# Patient Record
Sex: FEMALE | Employment: OTHER | URBAN - METROPOLITAN AREA
[De-identification: names, ages, dates, MRNs, and addresses within clinical notes are randomized per-mention and may not be internally consistent; named-entity substitution may affect disease eponyms.]

---

## 2017-02-17 ENCOUNTER — HOSPITAL ENCOUNTER (OUTPATIENT)
Age: 67
Setting detail: SPECIMEN
Discharge: HOME | End: 2017-02-17
Payer: MEDICARE

## 2017-02-17 LAB
CHOLESTEROL/HDL RATIO: 3.1
CHOLESTEROL: 154 MG/DL
HDLC SERPL-MCNC: 49 MG/DL
LDL CHOLESTEROL: 81 MG/DL (ref 0–130)
TRIGL SERPL-MCNC: 119 MG/DL
VLDLC SERPL CALC-MCNC: NORMAL MG/DL (ref 1–30)

## 2017-12-22 ENCOUNTER — HOSPITAL ENCOUNTER (OUTPATIENT)
Dept: MAMMOGRAPHY | Age: 67
Discharge: HOME | End: 2017-12-22
Payer: MEDICARE

## 2017-12-22 DIAGNOSIS — Z12.31 ENCOUNTER FOR SCREENING MAMMOGRAM FOR BREAST CANCER: ICD-10-CM

## 2017-12-22 PROCEDURE — G0202 SCR MAMMO BI INCL CAD: HCPCS

## 2018-04-06 ENCOUNTER — HOSPITAL ENCOUNTER (OUTPATIENT)
Age: 68
Setting detail: SPECIMEN
Discharge: HOME | End: 2018-04-06
Payer: MEDICARE

## 2018-04-06 LAB
ALBUMIN SERPL-MCNC: 3.9 G/DL (ref 3.5–5.2)
ALBUMIN/GLOBULIN RATIO: 1 (ref 1–2.5)
ALP BLD-CCNC: 106 U/L (ref 35–104)
ALT SERPL-CCNC: 24 U/L (ref 5–33)
ANION GAP SERPL CALCULATED.3IONS-SCNC: 12 MMOL/L (ref 9–17)
AST SERPL-CCNC: 19 U/L
BILIRUB SERPL-MCNC: 0.19 MG/DL (ref 0.3–1.2)
BUN BLDV-MCNC: 12 MG/DL (ref 8–23)
BUN/CREAT BLD: ABNORMAL (ref 9–20)
CALCIUM SERPL-MCNC: 9.7 MG/DL (ref 8.6–10.4)
CHLORIDE BLD-SCNC: 103 MMOL/L (ref 98–107)
CHOLESTEROL/HDL RATIO: 3.2
CHOLESTEROL: 145 MG/DL
CO2: 25 MMOL/L (ref 20–31)
CREAT SERPL-MCNC: 0.63 MG/DL (ref 0.5–0.9)
GFR AFRICAN AMERICAN: >60 ML/MIN
GFR NON-AFRICAN AMERICAN: >60 ML/MIN
GFR SERPL CREATININE-BSD FRML MDRD: ABNORMAL ML/MIN/{1.73_M2}
GFR SERPL CREATININE-BSD FRML MDRD: ABNORMAL ML/MIN/{1.73_M2}
GLUCOSE BLD-MCNC: 89 MG/DL (ref 70–99)
HDLC SERPL-MCNC: 46 MG/DL
LDL CHOLESTEROL: 67 MG/DL (ref 0–130)
POTASSIUM SERPL-SCNC: 4.4 MMOL/L (ref 3.7–5.3)
SODIUM BLD-SCNC: 140 MMOL/L (ref 135–144)
TOTAL PROTEIN: 8 G/DL (ref 6.4–8.3)
TRIGL SERPL-MCNC: 161 MG/DL
VLDLC SERPL CALC-MCNC: ABNORMAL MG/DL (ref 1–30)

## 2018-06-12 ENCOUNTER — HOSPITAL ENCOUNTER (OUTPATIENT)
Dept: MAMMOGRAPHY | Age: 68
Discharge: HOME | End: 2018-06-14
Payer: MEDICARE

## 2018-06-12 DIAGNOSIS — Z13.820 SCREENING FOR OSTEOPOROSIS: ICD-10-CM

## 2018-06-12 PROCEDURE — 77080 DXA BONE DENSITY AXIAL: CPT

## 2018-08-09 DIAGNOSIS — Z12.11 SCREENING FOR COLON CANCER: Primary | ICD-10-CM

## 2018-08-20 ENCOUNTER — TELEPHONE (OUTPATIENT)
Dept: GASTROENTEROLOGY | Age: 68
End: 2018-08-20

## 2018-08-20 NOTE — TELEPHONE ENCOUNTER
LM for patient to return phone call to confirm procedure for 8/23 at [facility], arrival time of 830 am, understanding of bowel prep and transportation requirements.

## 2018-08-22 ENCOUNTER — ANESTHESIA EVENT (OUTPATIENT)
Dept: OPERATING ROOM | Age: 68
End: 2018-08-22
Payer: MEDICARE

## 2018-08-23 ENCOUNTER — HOSPITAL ENCOUNTER (OUTPATIENT)
Age: 68
Setting detail: OUTPATIENT SURGERY
Discharge: HOME | End: 2018-08-23
Attending: INTERNAL MEDICINE | Admitting: INTERNAL MEDICINE
Payer: MEDICARE

## 2018-08-23 ENCOUNTER — ANESTHESIA (OUTPATIENT)
Dept: OPERATING ROOM | Age: 68
End: 2018-08-23
Payer: MEDICARE

## 2018-08-23 VITALS
DIASTOLIC BLOOD PRESSURE: 58 MMHG | RESPIRATION RATE: 21 BRPM | SYSTOLIC BLOOD PRESSURE: 112 MMHG | OXYGEN SATURATION: 100 %

## 2018-08-23 VITALS
DIASTOLIC BLOOD PRESSURE: 96 MMHG | BODY MASS INDEX: 26.29 KG/M2 | HEIGHT: 64 IN | WEIGHT: 154 LBS | OXYGEN SATURATION: 100 % | RESPIRATION RATE: 21 BRPM | HEART RATE: 83 BPM | SYSTOLIC BLOOD PRESSURE: 144 MMHG | TEMPERATURE: 97.9 F

## 2018-08-23 PROCEDURE — 6360000002 HC RX W HCPCS: Performed by: NURSE ANESTHETIST, CERTIFIED REGISTERED

## 2018-08-23 PROCEDURE — 3700000000 HC ANESTHESIA ATTENDED CARE: Performed by: INTERNAL MEDICINE

## 2018-08-23 PROCEDURE — 7100000011 HC PHASE II RECOVERY - ADDTL 15 MIN: Performed by: INTERNAL MEDICINE

## 2018-08-23 PROCEDURE — 3609010700 HC COLONOSCOPY POLYPECTOMY REMOVAL SNARE/STOMA: Performed by: INTERNAL MEDICINE

## 2018-08-23 PROCEDURE — 2709999900 HC NON-CHARGEABLE SUPPLY: Performed by: INTERNAL MEDICINE

## 2018-08-23 PROCEDURE — 2500000003 HC RX 250 WO HCPCS: Performed by: NURSE ANESTHETIST, CERTIFIED REGISTERED

## 2018-08-23 PROCEDURE — 88305 TISSUE EXAM BY PATHOLOGIST: CPT

## 2018-08-23 PROCEDURE — 2580000003 HC RX 258: Performed by: ANESTHESIOLOGY

## 2018-08-23 PROCEDURE — 3700000001 HC ADD 15 MINUTES (ANESTHESIA): Performed by: INTERNAL MEDICINE

## 2018-08-23 PROCEDURE — 7100000010 HC PHASE II RECOVERY - FIRST 15 MIN: Performed by: INTERNAL MEDICINE

## 2018-08-23 RX ORDER — LIDOCAINE HYDROCHLORIDE 10 MG/ML
INJECTION, SOLUTION EPIDURAL; INFILTRATION; INTRACAUDAL; PERINEURAL PRN
Status: DISCONTINUED | OUTPATIENT
Start: 2018-08-23 | End: 2018-08-23 | Stop reason: SDUPTHER

## 2018-08-23 RX ORDER — HYDROCHLOROTHIAZIDE 12.5 MG/1
12.5 TABLET ORAL DAILY
COMMUNITY

## 2018-08-23 RX ORDER — FENTANYL CITRATE 50 UG/ML
50 INJECTION, SOLUTION INTRAMUSCULAR; INTRAVENOUS EVERY 5 MIN PRN
Status: DISCONTINUED | OUTPATIENT
Start: 2018-08-23 | End: 2018-08-23 | Stop reason: HOSPADM

## 2018-08-23 RX ORDER — ONDANSETRON 2 MG/ML
4 INJECTION INTRAMUSCULAR; INTRAVENOUS
Status: DISCONTINUED | OUTPATIENT
Start: 2018-08-23 | End: 2018-08-23 | Stop reason: HOSPADM

## 2018-08-23 RX ORDER — IBUPROFEN 200 MG
1 CAPSULE ORAL DAILY
COMMUNITY

## 2018-08-23 RX ORDER — ANASTROZOLE 1 MG/1
1 TABLET ORAL DAILY
COMMUNITY
End: 2020-12-15 | Stop reason: ALTCHOICE

## 2018-08-23 RX ORDER — PROPOFOL 10 MG/ML
INJECTION, EMULSION INTRAVENOUS PRN
Status: DISCONTINUED | OUTPATIENT
Start: 2018-08-23 | End: 2018-08-23 | Stop reason: SDUPTHER

## 2018-08-23 RX ORDER — FENTANYL CITRATE 50 UG/ML
25 INJECTION, SOLUTION INTRAMUSCULAR; INTRAVENOUS EVERY 5 MIN PRN
Status: DISCONTINUED | OUTPATIENT
Start: 2018-08-23 | End: 2018-08-23 | Stop reason: HOSPADM

## 2018-08-23 RX ORDER — LIDOCAINE HYDROCHLORIDE 10 MG/ML
1 INJECTION, SOLUTION EPIDURAL; INFILTRATION; INTRACAUDAL; PERINEURAL
Status: DISCONTINUED | OUTPATIENT
Start: 2018-08-24 | End: 2018-08-23 | Stop reason: HOSPADM

## 2018-08-23 RX ORDER — POTASSIUM CHLORIDE 750 MG/1
10 CAPSULE, EXTENDED RELEASE ORAL DAILY
COMMUNITY

## 2018-08-23 RX ORDER — SODIUM CHLORIDE, SODIUM LACTATE, POTASSIUM CHLORIDE, CALCIUM CHLORIDE 600; 310; 30; 20 MG/100ML; MG/100ML; MG/100ML; MG/100ML
INJECTION, SOLUTION INTRAVENOUS CONTINUOUS
Status: DISCONTINUED | OUTPATIENT
Start: 2018-08-24 | End: 2018-08-23 | Stop reason: HOSPADM

## 2018-08-23 RX ADMIN — PROPOFOL 30 MG: 10 INJECTION, EMULSION INTRAVENOUS at 09:41

## 2018-08-23 RX ADMIN — SODIUM CHLORIDE, POTASSIUM CHLORIDE, SODIUM LACTATE AND CALCIUM CHLORIDE: 600; 310; 30; 20 INJECTION, SOLUTION INTRAVENOUS at 09:09

## 2018-08-23 RX ADMIN — PROPOFOL 20 MG: 10 INJECTION, EMULSION INTRAVENOUS at 09:50

## 2018-08-23 RX ADMIN — PROPOFOL 10 MG: 10 INJECTION, EMULSION INTRAVENOUS at 09:56

## 2018-08-23 RX ADMIN — PROPOFOL 20 MG: 10 INJECTION, EMULSION INTRAVENOUS at 09:44

## 2018-08-23 RX ADMIN — PROPOFOL 20 MG: 10 INJECTION, EMULSION INTRAVENOUS at 09:52

## 2018-08-23 RX ADMIN — PROPOFOL 20 MG: 10 INJECTION, EMULSION INTRAVENOUS at 09:46

## 2018-08-23 RX ADMIN — PROPOFOL 20 MG: 10 INJECTION, EMULSION INTRAVENOUS at 09:54

## 2018-08-23 RX ADMIN — LIDOCAINE HYDROCHLORIDE 30 MG: 10 INJECTION, SOLUTION EPIDURAL; INFILTRATION; INTRACAUDAL; PERINEURAL at 09:39

## 2018-08-23 RX ADMIN — PROPOFOL 30 MG: 10 INJECTION, EMULSION INTRAVENOUS at 09:42

## 2018-08-23 RX ADMIN — PROPOFOL 20 MG: 10 INJECTION, EMULSION INTRAVENOUS at 09:40

## 2018-08-23 RX ADMIN — PROPOFOL 20 MG: 10 INJECTION, EMULSION INTRAVENOUS at 09:48

## 2018-08-23 RX ADMIN — PROPOFOL 20 MG: 10 INJECTION, EMULSION INTRAVENOUS at 09:39

## 2018-08-23 ASSESSMENT — PULMONARY FUNCTION TESTS
PIF_VALUE: 1

## 2018-08-23 ASSESSMENT — PAIN SCALES - GENERAL
PAINLEVEL_OUTOF10: 0
PAINLEVEL_OUTOF10: 0

## 2018-08-23 ASSESSMENT — LIFESTYLE VARIABLES: SMOKING_STATUS: 1

## 2018-08-23 ASSESSMENT — PAIN - FUNCTIONAL ASSESSMENT: PAIN_FUNCTIONAL_ASSESSMENT: 0-10

## 2018-08-23 NOTE — ANESTHESIA PRE PROCEDURE
Department of Anesthesiology  Preprocedure Note       Name:  Julio C Blackwood   Age:  79 y.o.  :  1950                                          MRN:  [id number]         Date:  2018      Surgeon: Maddy Gallegos):  Melinda Gomez MD    Procedure: Procedure(s):  COLONOSCOPY    Medications prior to admission:   Prior to Admission medications    Medication Sig Start Date End Date Taking? Authorizing Provider   ATENOLOL PO Take by mouth    Historical Provider, MD   albuterol sulfate HFA (PROVENTIL HFA) 108 (90 BASE) MCG/ACT inhaler Inhale 1-2 puffs into the lungs every 4 hours as needed for Wheezing or Shortness of Breath (Space out to every 6 hours as symptoms improve) Space out to every 6 hours as symptoms improve. 12/15/16   Shun Mata MD       Current medications:    Current Facility-Administered Medications   Medication Dose Route Frequency Provider Last Rate Last Dose    [START ON 2018] lactated ringers infusion   [facility]ven[facility] lidocaine PF 1 % injection 1 mL  1 mL Intradermal Once PRN Juan Jose Chandra MD           Allergies:  No Known Allergies    Problem List:  There is no problem list on file for this patient. Past Medical History:  No past medical history on file. Past Surgical History:        Procedure Laterality Date    BREAST BIOPSY      right breast    COLONOSCOPY  2009    SKIN BIOPSY      removed from chest    TONSILLECTOMY AND ADENOIDECTOMY      TUBAL LIGATION         Social History:    Social History   Substance Use Topics    Smoking status: Not on file    Smokeless tobacco: Not on file    Alcohol use Not on file                                Counseling given: Not Answered      Vital Signs (Current): There were no vitals filed for this visit.                                            BP Readings from Last 3 Encounters:   12/15/16 116/76       NPO Status:

## 2018-08-23 NOTE — OP NOTE
COLONOSCOPY    DATE OF PROCEDURE: 8/23/2018    SURGEON: Brenton Pulido MD    ASSISTANT: None    PREOPERATIVE DIAGNOSIS: Screening colon    POSTOPERATIVE DIAGNOSIS: Polyp in the rectosigmoid area    OPERATION: Total colonoscopy     ANESTHESIA: Mac      ESTIMATED BLOOD LOSS: None    COMPLICATIONS: None     SPECIMENS:  Was Obtained: Polyp excised with biopsy forceps from rectosigmoid    HISTORY: The patient is a 79y.o. year old female with history of above preop diagnosis. I recommended colonoscopy with possible biopsy or polypectomy and I explained the risk, benefits, expected outcome, and alternatives to the procedure. Risks included but are not limited to bleeding, infection, respiratory distress, hypotension, and perforation of the colon and possibility of missing a lesion. The patient understands and is in agreement. PROCEDURE:  The patient's SPO2 remained above 90% throughout the procedure. Digital rectal exam was normal.  The colonoscope was inserted through the anus into the rectum and advanced under direct vision to the cecum without difficulty. Terminal ileum was examined for approximately 2 inches. The prep was poor. Findings:  Terminal ileum: normal    Cecum/Ascending colon: normal    Transverse colon: normal    Descending/Sigmoid colon: normal    Rectum/Anus: examined in normal and retroflexed positions and was abnormal: 5 mm polyp, pale looking in the rectosigmoid area excised with biopsy forceps. Patient has large amount of liquid present throughout the colon and in some areas this liquid could not be aspirated as the scope wasplugging up. small flat lesions in this poorly prepared colon  can be missed. Also there is a large amount of thick stool present in the rectum as well. Withdrawal Time was (minutes): 12          The colon was decompressed and the scope was removed. The patient tolerated the procedure and conscious sedation without unusual events.      During the procedure, the patient's blood pressure, pulse and oxygen saturation remained stable and documented. No unusual events occurred during the procedure. Patient was transferred to recovery room and will be discharged when criteria is met. Recommendations/Plan:   1. F/U Biopsies  2. F/U In Office as instructed  3. Discussed with the family  4. High fiber diet   5. Precautions to avoid constipation     Next colonoscopy: 3 years.   If Colonoscopy is less than 10 years the recommended reason is due:polyps, poor preparation    Electronically signed by [id number], MD  on 8/23/2018 at 10:01 AM

## 2018-08-23 NOTE — H&P
History and Physical Service   [place] [id number]    HISTORY AND PHYSICAL EXAMINATION            Date of Evaluation: 8/23/2018  Patient name:  Tom Nayak  MRN:   [id number]  YOB: 1950  PCP:    Tanner Ag    History Obtained From:     Patient    History of Present Illness: This is Tom Nayak a 79 y.o. female who presents today for a colonoscopy by Dr. Rey Noriega for colon screening. The patient completed the prep as directed until watery clear. Bowel movements have not  significantly changed No family history of colon cancer or polyps. Previous colonoscopy 2/2009. Patient denies bloody tarry stools, diarrhea alternating with constipation, fever, chills, night sweats, pain or unexplained weight loss. Past Medical History:     Past Medical History:   Diagnosis Date    Breast cancer ([facility] [address] 2013    left     Hypertension         Past Surgical History:     Past Surgical History:   Procedure Laterality Date    BREAST BIOPSY      right breast    BREAST LUMPECTOMY Left 2013    COLONOSCOPY  2/2009    SKIN BIOPSY  2000    removed from chest   [facility]        Medications Prior to Admission:     Prior to Admission medications    Medication Sig Start Date End Date Taking? Authorizing Provider   anastrozole (ARIMIDEX) 1 MG tablet Take 1 mg by mouth daily   Yes Historical Provider, MD   hydrochlorothiazide (HYDRODIURIL) 12.5 MG tablet Take 12.5 mg by mouth daily   Yes Historical Provider, MD   potassium chloride (MICRO-K) 10 MEQ extended release capsule Take 10 mEq by mouth daily   Yes Historical Provider, MD   calcium 600 MG TABS tablet Take 1 tablet by mouth daily   Yes Historical Provider, MD        Allergies:     Patient has no known allergies. Social History:     Tobacco:    reports that she has been smoking. She has a 40.00 pack-year smoking history.  She has never used smokeless tobacco.  Alcohol:      reports that she does discharge, hearing intact  Nose:  no drainage noted  Mouth: mucous membranes moist  Edentulous   Neck: supple, no carotid bruits, thyroid not palpable  Lungs: Bilateral equal air entry, clear to ausculation, no wheezing, rales or rhonchi, normal effort  Cardiovascular: normal rate, regular rhythm, no murmur, gallop, rub. Abdomen: Soft, nontender, nondistended, normal bowel sounds, no hepatomegaly or splenomegaly  Neurologic: There are no new focal motor or sensory deficits, normal muscle tone and bulk, no abnormal sensation, normal speech, cranial nerves II through XII grossly intact  Skin: No gross lesions, rashes, bruising or bleeding on exposed skin area  Extremities:  peripheral pulses palpable, no pedal edema or calf pain with palpation  Psych: normal affect     Investigations:      Laboratory Testing:  No results found for this or any previous visit (from the past 24 hour(s)). No results for input(s): HGB, HCT, WBC, MCV, PLATELET, NA, K, CL, CO2, BUN, CREATININE, GLUCOSE, INR, PROTIME, APTT, AST, ALT, LABALBU, HCG in the last 720 hours. Imaging/Diagnostics:      Diagnosis:      1. Colon screening    Plans:     1.  Colonoscopy       CARLOS Martinez CNP  8/23/2018  9:10 AM

## 2018-08-23 NOTE — ANESTHESIA POSTPROCEDURE EVALUATION
Department of Anesthesiology  Postprocedure Note    Patient: David Polo  MRN: [id number]  YOB: 1950  Date of evaluation: 8/23/2018  Time:  11:14 AM     Procedure Summary     Date:  08/23/18 Room / Location:  [facility]  / [facility]    Anesthesia Start:  3468 Anesthesia Stop:  1005    Procedure:  COLONOSCOPY POLYPECTOMY REMOVAL SNARE/STOMA (N/A ) Diagnosis:  (DX SCREENING)    Surgeon:  Twila Burton MD Responsible Provider:  Matilde Delgado MD    Anesthesia Type:  MAC ASA Status:  2          Anesthesia Type: MAC      Last vitals: Reviewed and per EMR flowsheets.        Anesthesia Post Evaluation    Patient location during evaluation: PACU  Level of consciousness: awake and alert  Airway patency: patent  Nausea & Vomiting: no nausea and no vomiting  Complications: no  Cardiovascular status: blood pressure returned to baseline  Respiratory status: acceptable  Hydration status: euvolemic

## 2018-08-24 LAB — SURGICAL PATHOLOGY REPORT: NORMAL

## 2018-10-12 ENCOUNTER — OFFICE VISIT (OUTPATIENT)
Dept: GASTROENTEROLOGY | Age: 68
End: 2018-10-12
Payer: MEDICARE

## 2018-10-12 VITALS
SYSTOLIC BLOOD PRESSURE: 139 MMHG | HEART RATE: 108 BPM | WEIGHT: 159.4 LBS | BODY MASS INDEX: 27.36 KG/M2 | DIASTOLIC BLOOD PRESSURE: 82 MMHG

## 2018-10-12 DIAGNOSIS — K62.1 HYPERPLASTIC RECTAL POLYP: Primary | ICD-10-CM

## 2018-10-12 PROCEDURE — 99213 OFFICE O/P EST LOW 20 MIN: CPT | Performed by: INTERNAL MEDICINE

## 2018-10-12 PROCEDURE — G8427 DOCREV CUR MEDS BY ELIG CLIN: HCPCS | Performed by: INTERNAL MEDICINE

## 2018-10-12 PROCEDURE — 3017F COLORECTAL CA SCREEN DOC REV: CPT | Performed by: INTERNAL MEDICINE

## 2018-10-12 PROCEDURE — 1090F PRES/ABSN URINE INCON ASSESS: CPT | Performed by: INTERNAL MEDICINE

## 2018-10-12 PROCEDURE — 1101F PT FALLS ASSESS-DOCD LE1/YR: CPT | Performed by: INTERNAL MEDICINE

## 2018-10-12 PROCEDURE — G8484 FLU IMMUNIZE NO ADMIN: HCPCS | Performed by: INTERNAL MEDICINE

## 2018-10-12 PROCEDURE — 1123F ACP DISCUSS/DSCN MKR DOCD: CPT | Performed by: INTERNAL MEDICINE

## 2018-10-12 PROCEDURE — 4040F PNEUMOC VAC/ADMIN/RCVD: CPT | Performed by: INTERNAL MEDICINE

## 2018-10-12 PROCEDURE — G8399 PT W/DXA RESULTS DOCUMENT: HCPCS | Performed by: INTERNAL MEDICINE

## 2018-10-12 PROCEDURE — 4004F PT TOBACCO SCREEN RCVD TLK: CPT | Performed by: INTERNAL MEDICINE

## 2018-10-12 PROCEDURE — G8419 CALC BMI OUT NRM PARAM NOF/U: HCPCS | Performed by: INTERNAL MEDICINE

## 2018-10-12 ASSESSMENT — ENCOUNTER SYMPTOMS
DIARRHEA: 0
ABDOMINAL DISTENTION: 0
ANAL BLEEDING: 0
BLOOD IN STOOL: 0
WHEEZING: 0
ABDOMINAL PAIN: 0
SHORTNESS OF BREATH: 0
VOMITING: 0
COUGH: 1
CONSTIPATION: 0
CHOKING: 0
NAUSEA: 0
TROUBLE SWALLOWING: 0
RECTAL PAIN: 0

## 2019-01-11 ENCOUNTER — HOSPITAL ENCOUNTER (OUTPATIENT)
Dept: MAMMOGRAPHY | Age: 69
Discharge: HOME | End: 2019-01-13
Payer: MEDICARE

## 2019-01-11 DIAGNOSIS — Z12.39 SCREENING BREAST EXAMINATION: ICD-10-CM

## 2019-01-11 PROCEDURE — 77063 BREAST TOMOSYNTHESIS BI: CPT

## 2019-09-27 ENCOUNTER — HOSPITAL ENCOUNTER (OUTPATIENT)
Age: 69
Setting detail: SPECIMEN
Discharge: HOME | End: 2019-09-27
Payer: MEDICARE

## 2019-09-27 LAB
ALBUMIN SERPL-MCNC: 3.9 G/DL (ref 3.5–5.2)
ALBUMIN/GLOBULIN RATIO: 0.8 (ref 1–2.5)
ALP BLD-CCNC: 92 U/L (ref 35–104)
ALT SERPL-CCNC: 14 U/L (ref 5–33)
ANION GAP SERPL CALCULATED.3IONS-SCNC: 14 MMOL/L (ref 9–17)
AST SERPL-CCNC: 17 U/L
BILIRUB SERPL-MCNC: 0.5 MG/DL (ref 0.3–1.2)
BILIRUBIN DIRECT: 0.12 MG/DL
BILIRUBIN, INDIRECT: 0.38 MG/DL (ref 0–1)
BUN BLDV-MCNC: 15 MG/DL (ref 8–23)
CALCIUM SERPL-MCNC: 9.9 MG/DL (ref 8.6–10.4)
CHLORIDE BLD-SCNC: 102 MMOL/L (ref 98–107)
CHOLESTEROL/HDL RATIO: 2.8
CHOLESTEROL: 138 MG/DL
CO2: 25 MMOL/L (ref 20–31)
CREAT SERPL-MCNC: 0.63 MG/DL (ref 0.5–0.9)
GFR AFRICAN AMERICAN: >60 ML/MIN
GFR NON-AFRICAN AMERICAN: >60 ML/MIN
GFR SERPL CREATININE-BSD FRML MDRD: ABNORMAL ML/MIN/{1.73_M2}
GFR SERPL CREATININE-BSD FRML MDRD: ABNORMAL ML/MIN/{1.73_M2}
GLUCOSE BLD-MCNC: 95 MG/DL (ref 70–99)
HDLC SERPL-MCNC: 49 MG/DL
LDL CHOLESTEROL: 68 MG/DL (ref 0–130)
POTASSIUM SERPL-SCNC: 4.2 MMOL/L (ref 3.7–5.3)
SODIUM BLD-SCNC: 141 MMOL/L (ref 135–144)
TOTAL PROTEIN: 8.6 G/DL (ref 6.4–8.3)
TRIGL SERPL-MCNC: 107 MG/DL
VLDLC SERPL CALC-MCNC: NORMAL MG/DL (ref 1–30)

## 2020-03-06 ENCOUNTER — HOSPITAL ENCOUNTER (OUTPATIENT)
Dept: MAMMOGRAPHY | Age: 70
Discharge: HOME | End: 2020-03-08
Payer: MEDICARE

## 2020-03-06 PROCEDURE — 77063 BREAST TOMOSYNTHESIS BI: CPT

## 2020-03-11 ENCOUNTER — HOSPITAL ENCOUNTER (EMERGENCY)
Age: 70
Discharge: HOME | End: 2020-03-11
Attending: EMERGENCY MEDICINE
Payer: MEDICARE

## 2020-03-11 ENCOUNTER — APPOINTMENT (OUTPATIENT)
Dept: CT IMAGING | Age: 70
End: 2020-03-11
Payer: MEDICARE

## 2020-03-11 VITALS
RESPIRATION RATE: 16 BRPM | WEIGHT: 160 LBS | HEART RATE: 89 BPM | OXYGEN SATURATION: 98 % | SYSTOLIC BLOOD PRESSURE: 147 MMHG | TEMPERATURE: 98.1 F | HEIGHT: 64 IN | BODY MASS INDEX: 27.31 KG/M2 | DIASTOLIC BLOOD PRESSURE: 68 MMHG

## 2020-03-11 PROCEDURE — 70450 CT HEAD/BRAIN W/O DYE: CPT

## 2020-03-11 PROCEDURE — 6370000000 HC RX 637 (ALT 250 FOR IP): Performed by: STUDENT IN AN ORGANIZED HEALTH CARE EDUCATION/TRAINING PROGRAM

## 2020-03-11 PROCEDURE — 6360000002 HC RX W HCPCS: Performed by: STUDENT IN AN ORGANIZED HEALTH CARE EDUCATION/TRAINING PROGRAM

## 2020-03-11 PROCEDURE — 99284 EMERGENCY DEPT VISIT MOD MDM: CPT

## 2020-03-11 RX ORDER — DEXAMETHASONE 4 MG/1
10 TABLET ORAL ONCE
Status: COMPLETED | OUTPATIENT
Start: 2020-03-11 | End: 2020-03-11

## 2020-03-11 RX ORDER — ACETAMINOPHEN 325 MG/1
650 TABLET ORAL ONCE
Status: COMPLETED | OUTPATIENT
Start: 2020-03-11 | End: 2020-03-11

## 2020-03-11 RX ADMIN — ACETAMINOPHEN 650 MG: 325 TABLET ORAL at 15:02

## 2020-03-11 RX ADMIN — DEXAMETHASONE 10 MG: 4 TABLET ORAL at 15:01

## 2020-03-11 ASSESSMENT — PAIN DESCRIPTION - ORIENTATION
ORIENTATION: LEFT
ORIENTATION: RIGHT

## 2020-03-11 ASSESSMENT — PAIN SCALES - GENERAL
PAINLEVEL_OUTOF10: 8
PAINLEVEL_OUTOF10: 5

## 2020-03-11 ASSESSMENT — PAIN DESCRIPTION - FREQUENCY
FREQUENCY: INTERMITTENT
FREQUENCY: INTERMITTENT

## 2020-03-11 ASSESSMENT — PAIN DESCRIPTION - LOCATION
LOCATION: HEAD
LOCATION: HEAD

## 2020-03-11 ASSESSMENT — ENCOUNTER SYMPTOMS
VOMITING: 0
PHOTOPHOBIA: 0
BACK PAIN: 0
NAUSEA: 0

## 2020-03-11 ASSESSMENT — PAIN DESCRIPTION - PAIN TYPE: TYPE: ACUTE PAIN

## 2020-03-11 ASSESSMENT — PAIN DESCRIPTION - DESCRIPTORS
DESCRIPTORS: THROBBING;SHARP
DESCRIPTORS: ACHING

## 2020-03-11 ASSESSMENT — PAIN DESCRIPTION - ONSET
ONSET: ON-GOING
ONSET: ON-GOING

## 2020-03-11 ASSESSMENT — PAIN DESCRIPTION - PROGRESSION
CLINICAL_PROGRESSION: NOT CHANGED
CLINICAL_PROGRESSION: GRADUALLY IMPROVING

## 2020-03-11 NOTE — ED NOTES
PT presents to the ED with complaints of a headache that started since Monday. Pain is reported on the left side of pt's head only and is unrelieved with tylenol and motrin. Pt states that she does not have any weakness or slurred speech.      Geena Martinez  03/11/20 2246

## 2020-03-11 NOTE — ED PROVIDER NOTES
[address] ED     Emergency Department     Faculty Attestation        I performed a history and physical examination of the patient and discussed management with the resident. I reviewed the residents note and agree with the documented findings and plan of care. Any areas of disagreement are noted on the chart. I was personally present for the key portions of any procedures. I have documented in the chart those procedures where I was not present during the key portions. I have reviewed the emergency nurses triage note. I agree with the chief complaint, past medical history, past surgical history, allergies, medications, social and family history as documented unless otherwise noted below. For mid-level providers such as nurse practitioners as well as physicians assistants:    I have personally seen and evaluated the patient. I find the patient's history and physical exam are consistent with NP/PA documentation. I agree with the care provided, treatment rendered, disposition, & follow-up plan. Additional findings are as noted.     Vital Signs: BP (!) 147/68   Pulse 89   Temp 98.1 °F (36.7 °C) (Oral)   Resp 16   Ht 5' 4\" (1.626 m)   Wt 160 lb (72.6 kg)   SpO2 98%   BMI 27.46 kg/m²   PCP:  Vaishnavi Calixto    Pertinent Comments:           Critical Care  None          Dereck Plata MD  Attending Emergency Medicine Physician              Valerie Puente MD  03/11/20 7415

## 2020-03-11 NOTE — ED PROVIDER NOTES
[facility] ED  Emergency Department Encounter  EmergencyMedicine Resident     Pt Shima Diamond  MRN: [id number]  [place] 1950  Date of evaluation: 3/11/20  PCP:  [facility]       Chief Complaint   Patient presents with    Headache     since monday        HISTORY OF PRESENT ILLNESS  (Location/Symptom, Timing/Onset, Context/Setting, Quality, Duration, Modifying Factors, Severity.)      Yoanna Choudhury is a 71 y.o. female who presents with left-sided headache which is been ongoing for the last 2 days. States it is throbbing in nature and is intermittent, occurring every 10 seconds. She denies any associated photophobia, phonophobia, changes in vision or hearing, neck pain, nausea, vomiting, weakness or numbness. She denies a significant past history of headaches. States that she was seen by her physician earlier today and was told to come to the emergency department for CT head. She has a past history of breast cancer and hypertension. States that she is taking all of her medications as prescribed. She states that she has been using Tylenol and ibuprofen without significant improvement in her symptoms. PAST MEDICAL / SURGICAL / SOCIAL / FAMILY HISTORY      has a past medical history of Breast cancer ([facility] [address] and Hypertension. has a past surgical history that includes Tubal ligation (1990); Tonsillectomy and adenoidectomy; skin biopsy (2000); Breast biopsy; Colonoscopy (2/2009); and Breast lumpectomy (Left, 2013).     Social History     Socioeconomic History    Marital status:      Spouse name: Not on file    Number of children: Not on file    Years of education: Not on file    Highest education level: Not on file   Occupational History    Not on file   Social Needs    Financial resource strain: Not on file    Food insecurity     Worry: Not on file     Inability: Not on file    Transportation needs     Medical: Not on file     Non-medical: Not absence of a cardiologist.    EMERGENCY DEPARTMENT COURSE:  Patient is alert and oriented, no acute distress. Vitals are within normal limits with exception of mild hypertension. No neurologic deficit on examination. No meningeal signs. No temporal artery tenderness. No loss of consciousness, no association with exertion. No radiation of pain into face. Only headache red flag is history of breast cancer. Because of this, CT head was obtained and was unremarkable. Patient was treated with Decadron and Tylenol with some improvement in symptoms. Patient was encouraged to follow-up with her primary care physician in the next 2 to 3 days or return to the emergency department with any worsening symptoms, she voiced her understanding. No clear etiology of symptoms based on ED work-up. No evidence of intracranial hemorrhage, intracranial mass, cerebral edema, [place] Hunt syndrome, temporal arteritis, as pain is not radiating to face, of low clinical suspicion for trigeminal neuralgia. Patient remained stable throughout emergency department stay. PROCEDURES:  None    CONSULTS:  None    CRITICAL CARE:  None    FINAL IMPRESSION      1. Acute intractable headache, unspecified headache type          DISPOSITION / PLAN     DISPOSITION Decision To Discharge 03/11/2020 03:40:59 PM      PATIENT REFERRED TO:  Sandy Valencia  [address] [place] [address]  [phone]    Schedule an appointment as soon as possible for a visit       [facility] ED  [address] [place] [address]  [phone]    If symptoms worsen      DISCHARGE MEDICATIONS:  Discharge Medication List as of 3/11/2020  3:42 PM          Codi Thompson D.O.   Emergency Medicine Resident    (Please note that portions ofthis note were completed with a voice recognition program.  Efforts were made to edit the dictations but occasionally words are mis-transcribed.)      Codi Thompson MD  03/11/20 9233

## 2020-10-16 ENCOUNTER — HOSPITAL ENCOUNTER (OUTPATIENT)
Age: 70
Discharge: HOME | End: 2020-10-16
Payer: MEDICARE

## 2020-10-16 LAB
ALBUMIN SERPL-MCNC: 3.9 G/DL (ref 3.5–5.2)
ALBUMIN/GLOBULIN RATIO: 1 (ref 1–2.5)
ALP BLD-CCNC: 112 U/L (ref 35–104)
ALT SERPL-CCNC: 18 U/L (ref 5–33)
ANION GAP SERPL CALCULATED.3IONS-SCNC: 12 MMOL/L (ref 9–17)
AST SERPL-CCNC: 17 U/L
BILIRUB SERPL-MCNC: 0.3 MG/DL (ref 0.3–1.2)
BUN BLDV-MCNC: 9 MG/DL (ref 8–23)
BUN/CREAT BLD: ABNORMAL (ref 9–20)
CALCIUM SERPL-MCNC: 9.5 MG/DL (ref 8.6–10.4)
CHLORIDE BLD-SCNC: 104 MMOL/L (ref 98–107)
CHOLESTEROL/HDL RATIO: 2.8
CHOLESTEROL: 146 MG/DL
CO2: 23 MMOL/L (ref 20–31)
CREAT SERPL-MCNC: 0.68 MG/DL (ref 0.5–0.9)
GFR AFRICAN AMERICAN: >60 ML/MIN
GFR NON-AFRICAN AMERICAN: >60 ML/MIN
GFR SERPL CREATININE-BSD FRML MDRD: ABNORMAL ML/MIN/{1.73_M2}
GFR SERPL CREATININE-BSD FRML MDRD: ABNORMAL ML/MIN/{1.73_M2}
GLUCOSE BLD-MCNC: 92 MG/DL (ref 70–99)
HDLC SERPL-MCNC: 52 MG/DL
LDL CHOLESTEROL: 76 MG/DL (ref 0–130)
POTASSIUM SERPL-SCNC: 4 MMOL/L (ref 3.7–5.3)
SODIUM BLD-SCNC: 139 MMOL/L (ref 135–144)
TOTAL PROTEIN: 8 G/DL (ref 6.4–8.3)
TRIGL SERPL-MCNC: 91 MG/DL
TSH SERPL DL<=0.05 MIU/L-ACNC: 0.06 MIU/L (ref 0.3–5)
VLDLC SERPL CALC-MCNC: NORMAL MG/DL (ref 1–30)

## 2020-10-16 PROCEDURE — 84443 ASSAY THYROID STIM HORMONE: CPT

## 2020-10-16 PROCEDURE — 36415 COLL VENOUS BLD VENIPUNCTURE: CPT

## 2020-10-16 PROCEDURE — 80053 COMPREHEN METABOLIC PANEL: CPT

## 2020-10-16 PROCEDURE — 80061 LIPID PANEL: CPT

## 2020-11-04 ENCOUNTER — HOSPITAL ENCOUNTER (OUTPATIENT)
Dept: MAMMOGRAPHY | Age: 70
Discharge: HOME | End: 2020-11-06
Payer: MEDICARE

## 2020-11-04 PROCEDURE — 77080 DXA BONE DENSITY AXIAL: CPT

## 2020-12-14 ENCOUNTER — HOSPITAL ENCOUNTER (OUTPATIENT)
Dept: LAB | Age: 70
Setting detail: SPECIMEN
Discharge: HOME | End: 2020-12-14
Payer: MEDICARE

## 2020-12-14 PROCEDURE — U0003 INFECTIOUS AGENT DETECTION BY NUCLEIC ACID (DNA OR RNA); SEVERE ACUTE RESPIRATORY SYNDROME CORONAVIRUS 2 (SARS-COV-2) (CORONAVIRUS DISEASE [COVID-19]), AMPLIFIED PROBE TECHNIQUE, MAKING USE OF HIGH THROUGHPUT TECHNOLOGIES AS DESCRIBED BY CMS-2020-01-R: HCPCS

## 2020-12-16 LAB
SARS-COV-2, RAPID: NORMAL
SARS-COV-2: NORMAL
SARS-COV-2: NOT DETECTED
SOURCE: NORMAL

## 2020-12-17 ENCOUNTER — ANESTHESIA EVENT (OUTPATIENT)
Dept: OPERATING ROOM | Age: 70
End: 2020-12-17
Payer: MEDICARE

## 2020-12-17 ENCOUNTER — HOSPITAL ENCOUNTER (OUTPATIENT)
Age: 70
Setting detail: OUTPATIENT SURGERY
Discharge: HOME | End: 2020-12-17
Attending: OPHTHALMOLOGY | Admitting: OPHTHALMOLOGY
Payer: MEDICARE

## 2020-12-17 ENCOUNTER — ANESTHESIA (OUTPATIENT)
Dept: OPERATING ROOM | Age: 70
End: 2020-12-17
Payer: MEDICARE

## 2020-12-17 VITALS
DIASTOLIC BLOOD PRESSURE: 72 MMHG | HEART RATE: 91 BPM | RESPIRATION RATE: 20 BRPM | WEIGHT: 158.73 LBS | SYSTOLIC BLOOD PRESSURE: 144 MMHG | TEMPERATURE: 97 F | BODY MASS INDEX: 27.1 KG/M2 | OXYGEN SATURATION: 94 % | HEIGHT: 64 IN

## 2020-12-17 VITALS — SYSTOLIC BLOOD PRESSURE: 148 MMHG | OXYGEN SATURATION: 99 % | DIASTOLIC BLOOD PRESSURE: 70 MMHG

## 2020-12-17 PROBLEM — H35.343 MACULAR HOLE OF BOTH EYES: Chronic | Status: ACTIVE | Noted: 2020-12-17

## 2020-12-17 LAB
GFR NON-AFRICAN AMERICAN: >60 ML/MIN
GFR SERPL CREATININE-BSD FRML MDRD: >60 ML/MIN
GFR SERPL CREATININE-BSD FRML MDRD: NORMAL ML/MIN/{1.73_M2}
GLUCOSE BLD-MCNC: 121 MG/DL (ref 74–100)
POC CREATININE: 0.75 MG/DL (ref 0.51–1.19)
POC POTASSIUM: 3.8 MMOL/L (ref 3.5–4.5)

## 2020-12-17 PROCEDURE — 2709999900 HC NON-CHARGEABLE SUPPLY: Performed by: OPHTHALMOLOGY

## 2020-12-17 PROCEDURE — 2580000003 HC RX 258: Performed by: ANESTHESIOLOGY

## 2020-12-17 PROCEDURE — 82565 ASSAY OF CREATININE: CPT

## 2020-12-17 PROCEDURE — 7100000040 HC SPAR PHASE II RECOVERY - FIRST 15 MIN: Performed by: OPHTHALMOLOGY

## 2020-12-17 PROCEDURE — 6360000002 HC RX W HCPCS: Performed by: OPHTHALMOLOGY

## 2020-12-17 PROCEDURE — 84132 ASSAY OF SERUM POTASSIUM: CPT

## 2020-12-17 PROCEDURE — 6360000002 HC RX W HCPCS: Performed by: NURSE ANESTHETIST, CERTIFIED REGISTERED

## 2020-12-17 PROCEDURE — 82947 ASSAY GLUCOSE BLOOD QUANT: CPT

## 2020-12-17 PROCEDURE — 3600000014 HC SURGERY LEVEL 4 ADDTL 15MIN: Performed by: OPHTHALMOLOGY

## 2020-12-17 PROCEDURE — 2500000003 HC RX 250 WO HCPCS: Performed by: OPHTHALMOLOGY

## 2020-12-17 PROCEDURE — 2580000003 HC RX 258: Performed by: OPHTHALMOLOGY

## 2020-12-17 PROCEDURE — 3700000000 HC ANESTHESIA ATTENDED CARE: Performed by: OPHTHALMOLOGY

## 2020-12-17 PROCEDURE — 3600000004 HC SURGERY LEVEL 4 BASE: Performed by: OPHTHALMOLOGY

## 2020-12-17 PROCEDURE — 6370000000 HC RX 637 (ALT 250 FOR IP): Performed by: OPHTHALMOLOGY

## 2020-12-17 PROCEDURE — 7100000041 HC SPAR PHASE II RECOVERY - ADDTL 15 MIN: Performed by: OPHTHALMOLOGY

## 2020-12-17 PROCEDURE — 3700000001 HC ADD 15 MINUTES (ANESTHESIA): Performed by: OPHTHALMOLOGY

## 2020-12-17 PROCEDURE — 2500000003 HC RX 250 WO HCPCS: Performed by: NURSE ANESTHETIST, CERTIFIED REGISTERED

## 2020-12-17 RX ORDER — ONDANSETRON 2 MG/ML
INJECTION INTRAMUSCULAR; INTRAVENOUS PRN
Status: DISCONTINUED | OUTPATIENT
Start: 2020-12-17 | End: 2020-12-17 | Stop reason: SDUPTHER

## 2020-12-17 RX ORDER — LIDOCAINE HYDROCHLORIDE 10 MG/ML
INJECTION, SOLUTION EPIDURAL; INFILTRATION; INTRACAUDAL; PERINEURAL PRN
Status: DISCONTINUED | OUTPATIENT
Start: 2020-12-17 | End: 2020-12-17 | Stop reason: SDUPTHER

## 2020-12-17 RX ORDER — FENTANYL CITRATE 50 UG/ML
INJECTION, SOLUTION INTRAMUSCULAR; INTRAVENOUS PRN
Status: DISCONTINUED | OUTPATIENT
Start: 2020-12-17 | End: 2020-12-17 | Stop reason: SDUPTHER

## 2020-12-17 RX ORDER — CEFTAZIDIME 1 G/1
INJECTION, POWDER, FOR SOLUTION INTRAMUSCULAR; INTRAVENOUS PRN
Status: DISCONTINUED | OUTPATIENT
Start: 2020-12-17 | End: 2020-12-17 | Stop reason: ALTCHOICE

## 2020-12-17 RX ORDER — PROPOFOL 10 MG/ML
INJECTION, EMULSION INTRAVENOUS PRN
Status: DISCONTINUED | OUTPATIENT
Start: 2020-12-17 | End: 2020-12-17 | Stop reason: SDUPTHER

## 2020-12-17 RX ORDER — PHENYLEPHRINE HYDROCHLORIDE 100 MG/ML
1 SOLUTION/ DROPS OPHTHALMIC EVERY 5 MIN PRN
Status: COMPLETED | OUTPATIENT
Start: 2020-12-17 | End: 2020-12-17

## 2020-12-17 RX ORDER — ERYTHROMYCIN 5 MG/G
OINTMENT OPHTHALMIC PRN
Status: DISCONTINUED | OUTPATIENT
Start: 2020-12-17 | End: 2020-12-17 | Stop reason: ALTCHOICE

## 2020-12-17 RX ORDER — INDOCYANINE GREEN AND WATER 25 MG
KIT INJECTION PRN
Status: DISCONTINUED | OUTPATIENT
Start: 2020-12-17 | End: 2020-12-17 | Stop reason: ALTCHOICE

## 2020-12-17 RX ORDER — TOBRAMYCIN AND DEXAMETHASONE 3; 1 MG/ML; MG/ML
1 SUSPENSION/ DROPS OPHTHALMIC
Status: COMPLETED | OUTPATIENT
Start: 2020-12-17 | End: 2020-12-17

## 2020-12-17 RX ORDER — TROPICAMIDE 10 MG/ML
SOLUTION/ DROPS OPHTHALMIC PRN
Status: DISCONTINUED | OUTPATIENT
Start: 2020-12-17 | End: 2020-12-17 | Stop reason: ALTCHOICE

## 2020-12-17 RX ORDER — BALANCED SALT SOLUTION ENRICHED WITH BICARBONATE, DEXTROSE, AND GLUTATHIONE
KIT INTRAOCULAR PRN
Status: DISCONTINUED | OUTPATIENT
Start: 2020-12-17 | End: 2020-12-17 | Stop reason: ALTCHOICE

## 2020-12-17 RX ORDER — SODIUM CHLORIDE, SODIUM LACTATE, POTASSIUM CHLORIDE, CALCIUM CHLORIDE 600; 310; 30; 20 MG/100ML; MG/100ML; MG/100ML; MG/100ML
INJECTION, SOLUTION INTRAVENOUS CONTINUOUS
Status: DISCONTINUED | OUTPATIENT
Start: 2020-12-17 | End: 2020-12-17 | Stop reason: HOSPADM

## 2020-12-17 RX ORDER — TROPICAMIDE 10 MG/ML
1 SOLUTION/ DROPS OPHTHALMIC EVERY 5 MIN PRN
Status: COMPLETED | OUTPATIENT
Start: 2020-12-17 | End: 2020-12-17

## 2020-12-17 RX ORDER — DEXTROSE MONOHYDRATE 25 G/50ML
INJECTION, SOLUTION INTRAVENOUS PRN
Status: DISCONTINUED | OUTPATIENT
Start: 2020-12-17 | End: 2020-12-17 | Stop reason: ALTCHOICE

## 2020-12-17 RX ADMIN — TROPICAMIDE 1 DROP: 10 SOLUTION/ DROPS OPHTHALMIC at 09:10

## 2020-12-17 RX ADMIN — SODIUM CHLORIDE, POTASSIUM CHLORIDE, SODIUM LACTATE AND CALCIUM CHLORIDE: 600; 310; 30; 20 INJECTION, SOLUTION INTRAVENOUS at 09:22

## 2020-12-17 RX ADMIN — PROPOFOL 30 MG: 10 INJECTION, EMULSION INTRAVENOUS at 09:59

## 2020-12-17 RX ADMIN — LIDOCAINE HYDROCHLORIDE 50 MG: 10 INJECTION, SOLUTION EPIDURAL; INFILTRATION; INTRACAUDAL; PERINEURAL at 09:59

## 2020-12-17 RX ADMIN — TOBRAMYCIN AND DEXAMETHASONE 1 DROP: 3; 1 SUSPENSION/ DROPS OPHTHALMIC at 09:21

## 2020-12-17 RX ADMIN — PHENYLEPHRINE HYDROCHLORIDE 1 DROP: 100 SOLUTION/ DROPS OPHTHALMIC at 08:57

## 2020-12-17 RX ADMIN — FENTANYL CITRATE 25 MCG: 50 INJECTION, SOLUTION INTRAMUSCULAR; INTRAVENOUS at 09:50

## 2020-12-17 RX ADMIN — FENTANYL CITRATE 25 MCG: 50 INJECTION, SOLUTION INTRAMUSCULAR; INTRAVENOUS at 09:57

## 2020-12-17 RX ADMIN — TROPICAMIDE 1 DROP: 10 SOLUTION/ DROPS OPHTHALMIC at 08:56

## 2020-12-17 RX ADMIN — TROPICAMIDE 1 DROP: 10 SOLUTION/ DROPS OPHTHALMIC at 09:02

## 2020-12-17 RX ADMIN — FENTANYL CITRATE 50 MCG: 50 INJECTION, SOLUTION INTRAMUSCULAR; INTRAVENOUS at 09:47

## 2020-12-17 RX ADMIN — PHENYLEPHRINE HYDROCHLORIDE 1 DROP: 100 SOLUTION/ DROPS OPHTHALMIC at 09:02

## 2020-12-17 RX ADMIN — ONDANSETRON 4 MG: 2 INJECTION INTRAMUSCULAR; INTRAVENOUS at 10:02

## 2020-12-17 RX ADMIN — PHENYLEPHRINE HYDROCHLORIDE 1 DROP: 100 SOLUTION/ DROPS OPHTHALMIC at 09:10

## 2020-12-17 ASSESSMENT — PULMONARY FUNCTION TESTS
PIF_VALUE: 0

## 2020-12-17 ASSESSMENT — PAIN SCALES - GENERAL
PAINLEVEL_OUTOF10: 0

## 2020-12-17 ASSESSMENT — PAIN - FUNCTIONAL ASSESSMENT: PAIN_FUNCTIONAL_ASSESSMENT: 0-10

## 2020-12-17 NOTE — H&P
History and Physical    Pt Name: Alfa Varela  MRN: [id number]  YOB: 1950  Date of evaluation: 12/17/2020  Primary Care Physician: Chidi Esquivel MD    SUBJECTIVE:   History of Chief Complaint:    Alfa Varela is a 79 y.o. female who is scheduled today for right vitrectomy. She reports decreased distorted vision on the right since March 2020, says she especially has problems with seeing up close. She reports having cataract removal bilaterally since. Allergies  has No Known Allergies. Medications  Prior to Admission medications    Medication Sig Start Date End Date Taking? Authorizing Provider   hydrochlorothiazide (HYDRODIURIL) 12.5 MG tablet Take 12.5 mg by mouth daily   Yes Historical Provider, MD   potassium chloride (MICRO-K) 10 MEQ extended release capsule Take 10 mEq by mouth daily   Yes Historical Provider, MD   calcium 600 MG TABS tablet Take 1 tablet by mouth daily   Yes Historical Provider, MD     Past Medical History    has a past medical history of Blurred vision, right eye, Breast cancer ([address] Hypertension, Macular hole, right eye, Wears dentures, and Wears glasses. Past Surgical History   has a past surgical history that includes Tubal ligation (1990); Tonsillectomy and adenoidectomy; skin biopsy (2000); Breast biopsy; Colonoscopy (2/2009); Breast lumpectomy (Left, 2013); and Cataract removal with implant (Bilateral, 07/2020). Social History   reports that she has been smoking. She has a 40.00 pack-year smoking history. She has never used smokeless tobacco.   reports no history of alcohol use. reports no history of drug use. Marital Status   Children one son  Occupation retired,    Family History  Family Status   Relation Name Status    Mother  (Not Specified)   [facility] Sister  (Not Specified)     family history includes Breast Cancer in her mother; Cancer in her sister; Diabetes in her mother; Hypertension in her mother and sister.       OBJECTIVE:   VITALS:  height is 5' 4\" (1.626 m) and weight is 158 lb 11.7 oz (72 kg). Her temporal temperature is 96.8 °F (36 °C). Her blood pressure is 144/90 (abnormal) and her pulse is 110. Her respiration is 16 and oxygen saturation is 97%. CONSTITUTIONAL:Alert and orientated to person, place and time. No acute distress. Friendly. Very pleasant. SKIN:  Warm & dry, no rashes on exposed skin  HEENT: HEAD: Normocephalic, atraumatic        EYES:  EOMs intact, conjunctiva clear, right pupil dilated after eye drops       EARS:  Equal bilaterally, no edema or thickening, skin is intact without lumps or lesions. No discharge. NOSE:  Nares patent, septum midline, no rhinorrhea      MOUTH/THROAT:  Mucous membranes moist, tongue is pink, uvula midline, edentulous   NECK:  Supple, no lymphadenopathy, full ROM  LUNGS: Respirations even and non-labored. Somewhat diminished breath sounds, no wheezing  CARDIOVASCULAR:mildly tachycardic, no murmurs/rubs/gallops   ABDOMEN: soft, non-tender, non-distended, bowel sounds active x 4, rotund    MUSCULOSKELETAL: Full ROM bilateral upper extremities, Full ROM bilateral lower extremities. Strength of 5/5 bilateral upper extremities. Strength 5/5 bilateral lower extremities. VASCULAR:  Brisk cap refill bilateral fingers. Radial pulses are intact, 2+ bilaterally. Dorsalis pedis pulse 2+ bilaterally. No edema or varicosities bilateral lower extremities  NEUROLOGIC: CN II-XII are grossly intact. Gait not assessed. IMPRESSIONS:   Macular hole, OD      Diagnosis Date    Blurred vision, right eye     Breast cancer ([facility] [address] 2013    left  radiation    Hypertension     pcp dr Rachel armstrong, right eye     Wears dentures     full    Wears glasses     reading   1.    PLANS:   Vitrectomy- OD    KRIS GONZALEZ-CNP  Electronically signed 12/17/2020 at 9:32 AM

## 2020-12-17 NOTE — ANESTHESIA POSTPROCEDURE EVALUATION
Department of Anesthesiology  Postprocedure Note    Patient: Noris Scott  MRN: [id number]  YOB: 1950  Date of evaluation: 12/17/2020  Time:  12:03 PM     Procedure Summary     Date: 12/17/20 Room / Location: [facility] OR 05 / [facility]    Anesthesia Start: 0945 Anesthesia Stop: 7215    Procedure: VITRECTOMY 25 GAUGE, AIR FLUID EXCHANGE, MEMBRANE PEEL, ICG, AIR GAS EXCHANGE 16%SF6 (Right ) Diagnosis: (MACULAR HOLE RIGHT EYE)    Surgeons: Martha Tripp MD Responsible Provider: Meredith Ayers MD    Anesthesia Type: MAC, TIVA ASA Status: 2          Anesthesia Type: MAC, TIVA    Glen Phase I:      Glen Phase II: Glen Score: 10    Last vitals: Reviewed and per EMR flowsheets.        Anesthesia Post Evaluation    Patient location during evaluation: PACU  Patient participation: complete - patient participated  Level of consciousness: awake and alert  Pain score: 0  Airway patency: patent  Nausea & Vomiting: no nausea and no vomiting  Complications: no  Cardiovascular status: hemodynamically stable  Respiratory status: room air  Hydration status: euvolemic

## 2020-12-17 NOTE — ANESTHESIA PRE PROCEDURE
Department of Anesthesiology  Preprocedure Note       Name:  Beatrice Marie   Age:  79 y.o.  :  1950                                          MRN:  [id number]         Date:  2020      Surgeon: Arlen Groves):  Wilda Mart MD    Procedure: Procedure(s):  VITRECTOMY 25 GAUGE, GAS FLUID EXCHANGE    Medications prior to admission:   Prior to Admission medications    Medication Sig Start Date End Date Taking? Authorizing Provider   hydrochlorothiazide (HYDRODIURIL) 12.5 MG tablet Take 12.5 mg by mouth daily   Yes Historical Provider, MD   potassium chloride (MICRO-K) 10 MEQ extended release capsule Take 10 mEq by mouth daily   Yes Historical Provider, MD   calcium 600 MG TABS tablet Take 1 tablet by mouth daily   Yes Historical Provider, MD       Current medications:    Current Facility-Administered Medications   Medication Dose Route Frequency Provider Last Rate Last Admin    tobramycin-dexamethasone (TOBRADEX) ophthalmic suspension 1 drop  1 drop Right Eye On Call to [address] Marco Oneal MD        tropicamide (MYDRIACYL) 1 % ophthalmic solution 1 drop  1 drop Right Eye Q5 Min PRN Wilda Mart MD        phenylephrine (CASIE-SYNEPHRINE) 10 % ophthalmic solution 1 drop  1 drop Right Eye Q5 Min PRN Wilda Mart MD        lactated ringers infusion   Intravenous Continuous Jonh Houston MD           Allergies:  No Known Allergies    Problem List:  There is no problem list on file for this patient.       Past Medical History:        Diagnosis Date    Blurred vision, right eye     Breast cancer ([address] 2013    left  radiation    Hypertension     pcp dr Tavares Every hole, right eye     Wears dentures     full    Wears glasses     reading       Past Surgical History:        Procedure Laterality Date    BREAST BIOPSY      right breast    BREAST LUMPECTOMY Left 2013    CATARACT REMOVAL WITH IMPLANT Bilateral 2020    COLONOSCOPY  2009    SKIN BIOPSY  2000    removed from chest    TONSILLECTOMY AND ADENOIDECTOMY      TUBAL LIGATION  1990       Social History:    Social History     Tobacco Use    Smoking status: Current Every Day Smoker     Packs/day: 1.00     Years: 40.00     Pack years: 40.00    Smokeless tobacco: Never Used   Substance Use Topics    Alcohol use: No                                Ready to quit: Not Answered  Counseling given: Not Answered      Vital Signs (Current):   Vitals:    12/15/20 1203 12/17/20 0840   Weight: 160 lb (72.6 kg) 158 lb 11.7 oz (72 kg)   Height: 5' 4\" (1.626 m) 5' 4\" (1.626 m)                                              BP Readings from Last 3 Encounters:   03/11/20 (!) 147/68   10/12/18 139/82   08/23/18 (!) 112/58       NPO Status: Time of last liquid consumption: 2359                        Time of last solid consumption: 2359                        Date of last liquid consumption: 12/16/20                        Date of last solid food consumption: 12/16/20    BMI:   Wt Readings from Last 3 Encounters:   12/17/20 158 lb 11.7 oz (72 kg)   03/11/20 160 lb (72.6 kg)   10/12/18 159 lb 6.4 oz (72.3 kg)     Body mass index is 27.25 kg/m². CBC:   Lab Results   Component Value Date    WBC 7.0 12/15/2016    RBC 4.67 12/15/2016    RBC 4.62 03/22/2012    HGB 14.0 12/15/2016    HCT 40.5 12/15/2016    MCV 86.8 12/15/2016    RDW 15.3 12/15/2016     12/15/2016     03/22/2012       CMP:   Lab Results   Component Value Date     10/16/2020    K 4.0 10/16/2020     10/16/2020    CO2 23 10/16/2020    BUN 9 10/16/2020    CREATININE 0.68 10/16/2020    GFRAA >60 10/16/2020    LABGLOM >60 10/16/2020    GLUCOSE 92 10/16/2020    GLUCOSE 115 03/22/2012    PROT 8.0 10/16/2020    CALCIUM 9.5 10/16/2020    BILITOT 0.30 10/16/2020    ALKPHOS 112 10/16/2020    AST 17 10/16/2020    ALT 18 10/16/2020       POC Tests: No results for input(s): POCGLU, POCNA, POCK, POCCL, POCBUN, POCHEMO, POCHCT in the last 72 hours.     Coags: No results

## 2020-12-17 NOTE — OP NOTE
Operative Note    Op Note    Terra Galdamez  12/17/2020     10:51 AM  1950  [id number]    Pre-operative Diagnosis: Macular Hole OD    Post-operative Diagnosis: Same    Procedure: Vitrectomy, ICG, Membrane Peeling, Air-Fluid Exchange Right Eye    Anesthesia: MAC    Surgeons/Assistants: Óscar Romano MD    Estimated Blood Loss: Less than 1 ML    Complications: None    Specimens: None    Reason for operation:  The patient has significant vision loss that is interfering with daily activities. Patient wishes to have surgery to close macular hole in anticipation of better vision. Patient understands necessity of constant face down positioning for 3 days after surgery. Visual recovery takes 3-6 months. Vision will not return completely to normal. Patient understands risks of surgery include, but are not limited to, bleeding, infection and retinal detachment. Cataract development is accelerated, usually resulting in cataract surgery in 6-24 months    The patient was brought to the operating room in good condition. Transient Propofol was given. Retrobulbar and topical anaesthesia were administered. The patient was prepped and draped in the usual manner. 25 gauge vitrectomy trocars were inserted in the usual quadrants. Infusion was inserted in the inferior temporal quadrant. Light pipe and ocutome placed through the superior trocars. Vitreous was detached using suction from the ocutome. It was removed from anterior to posterior and trimmed out past the equator in all quadrants. ICG was placed over the posterior pole and remained in place for 30 seconds. It was removed with silicone tipped extrusion. The ILM was peeled and removed with intraocular forceps. An air-fluid exchange was done. Air was exchanged for 16% SF6. No significant bleeding occurred at any time. No tears or detachment were found with scleral depression. The trocars were removed and sutured if there any leakage.  Subtenon's antibiotic was injected in the inferior nasal quadrant. The eye was patched in the usual fashion and the patient taken to the recovery room in good condition.

## 2021-03-04 ENCOUNTER — OFFICE VISIT (OUTPATIENT)
Dept: GASTROENTEROLOGY | Age: 71
End: 2021-03-04
Payer: MEDICARE

## 2021-03-04 VITALS
SYSTOLIC BLOOD PRESSURE: 126 MMHG | OXYGEN SATURATION: 98 % | BODY MASS INDEX: 27.64 KG/M2 | WEIGHT: 161.9 LBS | HEIGHT: 64 IN | DIASTOLIC BLOOD PRESSURE: 82 MMHG | HEART RATE: 68 BPM | TEMPERATURE: 98.3 F

## 2021-03-04 DIAGNOSIS — R19.5 POSITIVE COLORECTAL CANCER SCREENING USING COLOGUARD TEST: Primary | ICD-10-CM

## 2021-03-04 PROCEDURE — G8484 FLU IMMUNIZE NO ADMIN: HCPCS | Performed by: INTERNAL MEDICINE

## 2021-03-04 PROCEDURE — G8427 DOCREV CUR MEDS BY ELIG CLIN: HCPCS | Performed by: INTERNAL MEDICINE

## 2021-03-04 PROCEDURE — APPSS45 APP SPLIT SHARED TIME 31-45 MINUTES: Performed by: NURSE PRACTITIONER

## 2021-03-04 PROCEDURE — G8399 PT W/DXA RESULTS DOCUMENT: HCPCS | Performed by: INTERNAL MEDICINE

## 2021-03-04 PROCEDURE — 1090F PRES/ABSN URINE INCON ASSESS: CPT | Performed by: INTERNAL MEDICINE

## 2021-03-04 PROCEDURE — 4040F PNEUMOC VAC/ADMIN/RCVD: CPT | Performed by: INTERNAL MEDICINE

## 2021-03-04 PROCEDURE — G8417 CALC BMI ABV UP PARAM F/U: HCPCS | Performed by: INTERNAL MEDICINE

## 2021-03-04 PROCEDURE — 4004F PT TOBACCO SCREEN RCVD TLK: CPT | Performed by: INTERNAL MEDICINE

## 2021-03-04 PROCEDURE — 1123F ACP DISCUSS/DSCN MKR DOCD: CPT | Performed by: INTERNAL MEDICINE

## 2021-03-04 PROCEDURE — 99215 OFFICE O/P EST HI 40 MIN: CPT | Performed by: INTERNAL MEDICINE

## 2021-03-04 PROCEDURE — 3017F COLORECTAL CA SCREEN DOC REV: CPT | Performed by: INTERNAL MEDICINE

## 2021-03-04 RX ORDER — BISACODYL 5 MG
TABLET, DELAYED RELEASE (ENTERIC COATED) ORAL
Qty: 2 TABLET | Refills: 0 | Status: SHIPPED | OUTPATIENT
Start: 2021-03-04 | End: 2021-06-21 | Stop reason: ALTCHOICE

## 2021-03-04 RX ORDER — POLYETHYLENE GLYCOL 3350 17 G/17G
POWDER, FOR SOLUTION ORAL
Qty: 238 G | Refills: 0 | Status: SHIPPED | OUTPATIENT
Start: 2021-03-04 | End: 2021-06-21 | Stop reason: ALTCHOICE

## 2021-03-04 ASSESSMENT — ENCOUNTER SYMPTOMS
DIARRHEA: 1
VOMITING: 0
NAUSEA: 0
ABDOMINAL DISTENTION: 1
ABDOMINAL PAIN: 1
CONSTIPATION: 0
VOICE CHANGE: 0
COUGH: 0
ANAL BLEEDING: 0
BACK PAIN: 0
BLOOD IN STOOL: 0
SORE THROAT: 0
RECTAL PAIN: 0
TROUBLE SWALLOWING: 0
SHORTNESS OF BREATH: 0
CHOKING: 0

## 2021-03-04 NOTE — PROGRESS NOTES
GI OFFICE FOLLOW UP    INTERVAL HISTORY:   Dustin Benedict MD  [facility],  [address] [place] [address]    Chief Complaint   Patient presents with    GI Problem     Patient is here today to discuss a possible EGD     HISTORY OF PRESENT ILLNESS:     Patient being seen for colonoscopy screening. Patient recently had Cologuard testing and this was positive. Patient last had colonoscopy in 2018. Noted to have poor prep. Small hyperplastic in the rectosigmoid noted. Presently patient denies any GI symptoms. No nausea, vomiting, weight loss. No dysphagia, odynophagia, dyspeptic symptoms. Patient has good appetite. Bowel movements are satisfactory. Denies any significant constipation or diarrhea. No melena, hematochezia. No significant abdominal pain, cramping, bloating. Past medical history includes hypertension, history of breast cancer. No current anticoagulation or antiplatelet therapy. Denies any significant NSAID use. Past Medical,Family, and Social History reviewed and does contribute to the patient presenting condition. Patient's PMH/PSH,SH,PSYCH Hx, MEDs, ALLERGIES, and ROS were all reviewed and updated in the appropriate sections.  Yes      PAST MEDICAL HISTORY:  Past Medical History:   Diagnosis Date    Blurred vision, right eye     Breast cancer ([address] 2013    left  radiation    Hypertension     pcp dr Cheung Prudent hole, right eye     Wears dentures     full    Wears glasses     reading       Past Surgical History:   Procedure Laterality Date    BREAST BIOPSY      right breast    BREAST LUMPECTOMY Left 2013    CATARACT REMOVAL WITH IMPLANT Bilateral 07/2020    COLONOSCOPY  02/2009    SKIN BIOPSY  2000    removed from chest    TONSILLECTOMY AND ADENOIDECTOMY     [address]    VITRECTOMY Right 12/17/2020    VITRECTOMY 25 GAUGE, AIR FLUID EXCHANGE, MEMBRANE PEEL, ICG (Right )    VITRECTOMY Right 12/17/2020    VITRECTOMY 25 GAUGE, AIR FLUID EXCHANGE, MEMBRANE PEEL, ICG, AIR GAS EXCHANGE 16%SF6 performed by Jason Cook MD at [address] [place] [address]:    Current Outpatient Medications:     hydrochlorothiazide (HYDRODIURIL) 12.5 MG tablet, Take 12.5 mg by mouth daily, Disp: , Rfl:     potassium chloride (MICRO-K) 10 MEQ extended release capsule, Take 10 mEq by mouth daily, Disp: , Rfl:     calcium 600 MG TABS tablet, Take 1 tablet by mouth daily, Disp: , Rfl:     ALLERGIES:   No Known Allergies    FAMILY HISTORY:       Problem Relation Age of Onset    Hypertension Mother     Diabetes Mother     Breast Cancer Mother     Hypertension Sister     Cancer Sister         liver         SOCIAL HISTORY:   Social History     Socioeconomic History    Marital status:      Spouse name: Not on file    Number of children: Not on file    Years of education: Not on file    Highest education level: Not on file   Occupational History    Not on file   Social Needs    Financial resource strain: Not on file    Food insecurity     Worry: Not on file     Inability: Not on file    Transportation needs     Medical: Not on file     Non-medical: Not on file   Tobacco Use    Smoking status: Current Every Day Smoker     Packs/day: 1.00     Years: 40.00     Pack years: 40.00    Smokeless tobacco: Never Used   Substance and Sexual Activity    Alcohol use: No    Drug use: No    Sexual activity: Not on file   Lifestyle    Physical activity     Days per week: Not on file     Minutes per session: Not on file    Stress: Not on file   Relationships    Social connections     Talks on phone: Not on file     Gets together: Not on file     Attends Voodoo service: Not on file     Active member of club or organization: Not on file     Attends meetings of clubs or organizations: Not on file     Relationship status: Not on file    Intimate partner violence     Fear of current or ex partner: Not on file     Emotionally abused: Not on file     Physically abused: Not on file     Forced sexual activity: Not on file   Other Topics Concern    Not on file   Social History Narrative    Not on file         REVIEW OF SYSTEMS:         Review of Systems   Constitutional: Negative for appetite change, fatigue and unexpected weight change. HENT: Negative for dental problem, sore throat, trouble swallowing and voice change. Eyes: Positive for visual disturbance (glasses). Respiratory: Negative for cough, choking and shortness of breath. Cardiovascular: Negative for chest pain and leg swelling. Gastrointestinal: Positive for abdominal distention, abdominal pain and diarrhea. Negative for anal bleeding, blood in stool, constipation, nausea, rectal pain and vomiting. Genitourinary: Negative for difficulty urinating. Musculoskeletal: Negative for back pain, joint swelling and myalgias. Neurological: Negative for dizziness, tremors, weakness, light-headedness, numbness and headaches. Hematological: Does not bruise/bleed easily. Psychiatric/Behavioral: Negative for sleep disturbance. The patient is not nervous/anxious. PHYSICAL EXAMINATION:     Vital signs reviewed per the nursing documentation. There were no vitals taken for this visit. There is no height or weight on file to calculate BMI. Physical Exam  Vitals signs and nursing note reviewed. Constitutional:       Appearance: She is well-developed. HENT:      Head: Normocephalic and atraumatic. Eyes:      General: No scleral icterus. Conjunctiva/sclera: Conjunctivae normal.      Pupils: Pupils are equal, round, and reactive to light. Neck:      Musculoskeletal: Normal range of motion and neck supple. Thyroid: No thyromegaly. Vascular: No hepatojugular reflux or JVD. Trachea: No tracheal deviation.    Cardiovascular:      Rate and Rhythm: Normal rate and regular rhythm. Heart sounds: Normal heart sounds. Pulmonary:      Effort: Pulmonary effort is normal. No respiratory distress. Breath sounds: Normal breath sounds. No wheezing or rales. Abdominal:      General: Bowel sounds are normal. There is no distension. Palpations: Abdomen is soft. There is no hepatomegaly or mass. Tenderness: There is no abdominal tenderness. There is no rebound. Hernia: No hernia is present. Musculoskeletal:         General: No tenderness. Comments: No joint swelling   Lymphadenopathy:      Cervical: No cervical adenopathy. Skin:     General: Skin is warm. Findings: No bruising, ecchymosis, erythema or rash. Neurological:      Mental Status: She is alert and oriented to person, place, and time. Cranial Nerves: No cranial nerve deficit. Psychiatric:         Thought Content: Thought content normal.           LABORATORY DATA: Reviewed  Lab Results   Component Value Date    WBC 7.0 12/15/2016    HGB 14.0 12/15/2016    HCT 40.5 12/15/2016    MCV 86.8 12/15/2016     12/15/2016     10/16/2020    K 4.0 10/16/2020     10/16/2020    CO2 23 10/16/2020    BUN 9 10/16/2020    CREATININE 0.75 12/17/2020    LABALBU 3.9 10/16/2020    BILITOT 0.30 10/16/2020    ALKPHOS 112 (H) 10/16/2020    AST 17 10/16/2020    ALT 18 10/16/2020         Lab Results   Component Value Date    RBC 4.67 12/15/2016    HGB 14.0 12/15/2016    MCV 86.8 12/15/2016    MCH 30.0 12/15/2016    MCHC 34.6 12/15/2016    RDW 15.3 12/15/2016    MPV 9.3 12/15/2016    BASOPCT 0 12/15/2016    LYMPHSABS 1.10 12/15/2016    MONOSABS 0.80 12/15/2016    NEUTROABS 5.10 12/15/2016    EOSABS 0.10 12/15/2016    BASOSABS 0.00 12/15/2016         DIAGNOSTIC TESTING:     No results found. Assessment  No diagnosis found. Plan  Patient had positive cologuard. Last colonoscopy 2018. Noted to have poor prep. Will arrange colonoscopy and EGD.     The Endoscopic procedure was explained to the patient in detail  The prep and NPO were explained  All the Risks, Benefits, and Alternatives were explained  Risk of Bleeding, Perforation and Cardio Respiratory risks were explained  her questions were answered  The procedure has been scheduled with the  in the office  Patient was asked to give us a call for any questions  The patient has verbalized understanding and agreement to this plan. The patient was counseled at length about the risks of codey Covid-19 during their perioperative period and any recovery window from their procedure. The patient was made aware that codey Covid-19  may worsen their prognosis for recovering from their procedure  and lend to a higher morbidity and/or mortality risk. All material risks, benefits, and reasonable alternatives including postponing the procedure were discussed. The patient does wish to proceed with the procedure at this time. Thank you for allowing me to participate in the care of Ms. Evangelina Egan. For any further questions please do not hesitate to contact me. I have reviewed and agree with the ROS entered by the MA/LPN. Note is dictated utilizing voice recognition software. Unfortunately this leads to occasional typographical errors. Please contact our office if you have any questions    I independently performed an evaluation on Ulises Hernandez. I have reviewed the above documentation completed by the Nurse Practitioner and agree with the documented findings and plan of care. I have personally evaluated this patient with the APRN and have completed at least one if not all key elements of the E/M (history, physical exam, and MDM). Please see my additional contributions to the HPI, physical exam, assessment, and medical decision making.        Ramo Hammond MD,FACP, [facility]  Board Certified in Gastroenterology and [address] [place] [address] Gastroenterology  Office #: [phone]

## 2021-04-11 ENCOUNTER — HOSPITAL ENCOUNTER (OUTPATIENT)
Dept: LAB | Age: 71
Setting detail: SPECIMEN
Discharge: HOME | End: 2021-04-11
Payer: MEDICARE

## 2021-04-11 DIAGNOSIS — Z01.818 PREOP TESTING: Primary | ICD-10-CM

## 2021-04-11 PROCEDURE — U0005 INFEC AGEN DETEC AMPLI PROBE: HCPCS

## 2021-04-11 PROCEDURE — U0003 INFECTIOUS AGENT DETECTION BY NUCLEIC ACID (DNA OR RNA); SEVERE ACUTE RESPIRATORY SYNDROME CORONAVIRUS 2 (SARS-COV-2) (CORONAVIRUS DISEASE [COVID-19]), AMPLIFIED PROBE TECHNIQUE, MAKING USE OF HIGH THROUGHPUT TECHNOLOGIES AS DESCRIBED BY CMS-2020-01-R: HCPCS

## 2021-04-12 ENCOUNTER — TELEPHONE (OUTPATIENT)
Dept: GASTROENTEROLOGY | Age: 71
End: 2021-04-12

## 2021-04-12 LAB
SARS-COV-2: ABNORMAL
SARS-COV-2: ABNORMAL
SOURCE: ABNORMAL

## 2021-04-12 NOTE — TELEPHONE ENCOUNTER
Called [facility] and spoke to Court Poster, she states pt will have a rapid covid test on 4/15/21 due to indeterminate covid results.

## 2021-04-15 ENCOUNTER — HOSPITAL ENCOUNTER (OUTPATIENT)
Age: 71
Setting detail: OUTPATIENT SURGERY
Discharge: HOME | End: 2021-04-15
Attending: INTERNAL MEDICINE | Admitting: INTERNAL MEDICINE
Payer: MEDICARE

## 2021-04-15 ENCOUNTER — ANESTHESIA EVENT (OUTPATIENT)
Dept: OPERATING ROOM | Age: 71
End: 2021-04-15
Payer: MEDICARE

## 2021-04-15 ENCOUNTER — ANESTHESIA (OUTPATIENT)
Dept: OPERATING ROOM | Age: 71
End: 2021-04-15
Payer: MEDICARE

## 2021-04-15 VITALS
OXYGEN SATURATION: 99 % | WEIGHT: 158 LBS | HEART RATE: 81 BPM | SYSTOLIC BLOOD PRESSURE: 132 MMHG | DIASTOLIC BLOOD PRESSURE: 69 MMHG | TEMPERATURE: 97 F | BODY MASS INDEX: 26.98 KG/M2 | RESPIRATION RATE: 20 BRPM | HEIGHT: 64 IN

## 2021-04-15 VITALS — OXYGEN SATURATION: 100 % | SYSTOLIC BLOOD PRESSURE: 117 MMHG | DIASTOLIC BLOOD PRESSURE: 63 MMHG

## 2021-04-15 LAB
SARS-COV-2, RAPID: NOT DETECTED
SPECIMEN DESCRIPTION: NORMAL

## 2021-04-15 PROCEDURE — 45380 COLONOSCOPY AND BIOPSY: CPT | Performed by: INTERNAL MEDICINE

## 2021-04-15 PROCEDURE — 3609012400 HC EGD TRANSORAL BIOPSY SINGLE/MULTIPLE: Performed by: INTERNAL MEDICINE

## 2021-04-15 PROCEDURE — 88305 TISSUE EXAM BY PATHOLOGIST: CPT

## 2021-04-15 PROCEDURE — 3700000001 HC ADD 15 MINUTES (ANESTHESIA): Performed by: INTERNAL MEDICINE

## 2021-04-15 PROCEDURE — 43239 EGD BIOPSY SINGLE/MULTIPLE: CPT | Performed by: INTERNAL MEDICINE

## 2021-04-15 PROCEDURE — 3700000000 HC ANESTHESIA ATTENDED CARE: Performed by: INTERNAL MEDICINE

## 2021-04-15 PROCEDURE — 2500000003 HC RX 250 WO HCPCS: Performed by: NURSE ANESTHETIST, CERTIFIED REGISTERED

## 2021-04-15 PROCEDURE — 2709999900 HC NON-CHARGEABLE SUPPLY: Performed by: INTERNAL MEDICINE

## 2021-04-15 PROCEDURE — 6360000002 HC RX W HCPCS: Performed by: NURSE ANESTHETIST, CERTIFIED REGISTERED

## 2021-04-15 PROCEDURE — 87635 SARS-COV-2 COVID-19 AMP PRB: CPT

## 2021-04-15 PROCEDURE — 2580000003 HC RX 258: Performed by: ANESTHESIOLOGY

## 2021-04-15 PROCEDURE — 3609010600 HC COLONOSCOPY POLYPECTOMY SNARE/COLD BIOPSY: Performed by: INTERNAL MEDICINE

## 2021-04-15 PROCEDURE — 7100000010 HC PHASE II RECOVERY - FIRST 15 MIN: Performed by: INTERNAL MEDICINE

## 2021-04-15 PROCEDURE — 7100000011 HC PHASE II RECOVERY - ADDTL 15 MIN: Performed by: INTERNAL MEDICINE

## 2021-04-15 RX ORDER — LIDOCAINE HYDROCHLORIDE 10 MG/ML
1 INJECTION, SOLUTION EPIDURAL; INFILTRATION; INTRACAUDAL; PERINEURAL
Status: DISCONTINUED | OUTPATIENT
Start: 2021-04-16 | End: 2021-04-15 | Stop reason: HOSPADM

## 2021-04-15 RX ORDER — SODIUM CHLORIDE, SODIUM LACTATE, POTASSIUM CHLORIDE, CALCIUM CHLORIDE 600; 310; 30; 20 MG/100ML; MG/100ML; MG/100ML; MG/100ML
INJECTION, SOLUTION INTRAVENOUS CONTINUOUS
Status: DISCONTINUED | OUTPATIENT
Start: 2021-04-16 | End: 2021-04-15 | Stop reason: HOSPADM

## 2021-04-15 RX ORDER — LIDOCAINE HYDROCHLORIDE 20 MG/ML
INJECTION, SOLUTION EPIDURAL; INFILTRATION; INTRACAUDAL; PERINEURAL PRN
Status: DISCONTINUED | OUTPATIENT
Start: 2021-04-15 | End: 2021-04-15 | Stop reason: SDUPTHER

## 2021-04-15 RX ORDER — PROPOFOL 10 MG/ML
INJECTION, EMULSION INTRAVENOUS PRN
Status: DISCONTINUED | OUTPATIENT
Start: 2021-04-15 | End: 2021-04-15 | Stop reason: SDUPTHER

## 2021-04-15 RX ADMIN — PROPOFOL 20 MG: 10 INJECTION, EMULSION INTRAVENOUS at 10:41

## 2021-04-15 RX ADMIN — PROPOFOL 20 MG: 10 INJECTION, EMULSION INTRAVENOUS at 10:29

## 2021-04-15 RX ADMIN — PROPOFOL 30 MG: 10 INJECTION, EMULSION INTRAVENOUS at 10:21

## 2021-04-15 RX ADMIN — PROPOFOL 30 MG: 10 INJECTION, EMULSION INTRAVENOUS at 10:26

## 2021-04-15 RX ADMIN — PROPOFOL 20 MG: 10 INJECTION, EMULSION INTRAVENOUS at 10:14

## 2021-04-15 RX ADMIN — PROPOFOL 20 MG: 10 INJECTION, EMULSION INTRAVENOUS at 10:45

## 2021-04-15 RX ADMIN — PROPOFOL 20 MG: 10 INJECTION, EMULSION INTRAVENOUS at 10:17

## 2021-04-15 RX ADMIN — PROPOFOL 20 MG: 10 INJECTION, EMULSION INTRAVENOUS at 10:23

## 2021-04-15 RX ADMIN — PROPOFOL 30 MG: 10 INJECTION, EMULSION INTRAVENOUS at 10:19

## 2021-04-15 RX ADMIN — PROPOFOL 20 MG: 10 INJECTION, EMULSION INTRAVENOUS at 10:38

## 2021-04-15 RX ADMIN — PROPOFOL 80 MG: 10 INJECTION, EMULSION INTRAVENOUS at 10:12

## 2021-04-15 RX ADMIN — LIDOCAINE HYDROCHLORIDE 100 MG: 20 INJECTION, SOLUTION EPIDURAL; INFILTRATION; INTRACAUDAL; PERINEURAL at 10:12

## 2021-04-15 RX ADMIN — PROPOFOL 20 MG: 10 INJECTION, EMULSION INTRAVENOUS at 10:32

## 2021-04-15 RX ADMIN — SODIUM CHLORIDE, POTASSIUM CHLORIDE, SODIUM LACTATE AND CALCIUM CHLORIDE: 600; 310; 30; 20 INJECTION, SOLUTION INTRAVENOUS at 09:51

## 2021-04-15 RX ADMIN — PROPOFOL 20 MG: 10 INJECTION, EMULSION INTRAVENOUS at 10:35

## 2021-04-15 ASSESSMENT — PAIN SCALES - GENERAL: PAINLEVEL_OUTOF10: 0

## 2021-04-15 ASSESSMENT — PULMONARY FUNCTION TESTS
PIF_VALUE: 1

## 2021-04-15 ASSESSMENT — LIFESTYLE VARIABLES: SMOKING_STATUS: 1

## 2021-04-15 ASSESSMENT — PAIN - FUNCTIONAL ASSESSMENT: PAIN_FUNCTIONAL_ASSESSMENT: 0-10

## 2021-04-15 NOTE — H&P
History and Physical Service   [facility]    HISTORY AND PHYSICAL EXAMINATION            Date of Evaluation: 4/15/2021  Patient name:  Krzysztof De La Torre  MRN:   [id number]  YOB: 1950  PCP:    Truddie Angelucci, MD    History Obtained From:     Patient, medical records    History of Present Illness: This is Krzysztof De La Torre a 79 y.o. female who presents today for a colorectal cancer screenng by Dr. Basia Saleh for colon screening. Patient scheduled for colonoscopy. Patient followed bowel prep until watery clear. Previous colonoscopy \"2018 with polyps with poor prep but didn't return for repeat\" . No FH colon cancer or polyps. Patient denies bowel changes, bloody tarry stools, diarrhea alternating with constipation, abdominal pain or unintentional weight loss. No fever, chills, cough, wheezing or SOB     Past Medical History:     Past Medical History:   Diagnosis Date    Blurred vision, right eye     Breast cancer ([address] 2013    left  radiation    Hypertension     pcp dr Gloria Polo hole, right eye     Wears dentures     full    Wears glasses     reading        Past Surgical History:     Past Surgical History:   Procedure Laterality Date    BREAST BIOPSY      right breast    BREAST LUMPECTOMY Left 2013    CATARACT REMOVAL WITH IMPLANT Bilateral 07/2020    COLONOSCOPY  02/2009    SKIN BIOPSY  2000    removed from chest    TONSILLECTOMY AND ADENOIDECTOMY     [address] VITRECTOMY Right 12/17/2020    VITRECTOMY 25 GAUGE, AIR FLUID EXCHANGE, MEMBRANE PEEL, ICG (Right )    VITRECTOMY Right 12/17/2020    VITRECTOMY 25 GAUGE, AIR FLUID EXCHANGE, MEMBRANE PEEL, ICG, AIR GAS EXCHANGE 16%SF6 performed by Kulwant Styles MD at [place] [address]        Medications Prior to Admission:     Prior to Admission medications    Medication Sig Start Date End Date Taking?  Authorizing Provider   polyethylene glycol (MIRALAX) 17 GM/SCOOP powder Use as Directed per instructions provided by physician office. 3/4/21  Yes Lola Morfin MD   bisacodyl (BISACODYL) 5 MG EC tablet TAKE 2 TABS THE DAY BEFORE COLONOSCOPY AS DIRECTED ON BOWEL PREP INSTRUCTIONS GIVEN BY PHYSICIAN OFFICE 3/4/21  Yes Lola Morfin MD   hydrochlorothiazide (HYDRODIURIL) 12.5 MG tablet Take 12.5 mg by mouth daily    Historical Provider, MD   potassium chloride (MICRO-K) 10 MEQ extended release capsule Take 10 mEq by mouth daily    Historical Provider, MD   calcium 600 MG TABS tablet Take 1 tablet by mouth daily    Historical Provider, MD        Allergies:     Patient has no known allergies. Social History:     Tobacco:    reports that she has been smoking. She has a 40.00 pack-year smoking history. She has never used smokeless tobacco.  Alcohol:      reports no history of alcohol use. Drug Use:  reports no history of drug use. Family History:     Family History   Problem Relation Age of Onset    Hypertension Mother     Diabetes Mother     Breast Cancer Mother     Hypertension Sister     Cancer Sister         liver       Review of Systems:     Positive and Negative as described in HPI. CONSTITUTIONAL:  negative for fevers, chills, sweats, fatigue, weight loss  HEENT:  negative for vision, hearing changes, runny nose, throat pain  RESPIRATORY:  negative for shortness of breath, cough, congestion, wheezing. CARDIOVASCULAR:  negative for chest pain, palpitations.   GASTROINTESTINAL:  negative for nausea, vomiting, diarrhea, constipation, change in bowel habits, abdominal pain   GENITOURINARY:  negative for difficulty of urination, burning with urination, frequency   INTEGUMENT:  negative for rash, skin lesions, easy bruising   HEMATOLOGIC/LYMPHATIC:  negative for swelling/edema   ALLERGIC/IMMUNOLOGIC:  negative for urticaria , itching  ENDOCRINE:  negative increase in drinking, increase in urination, hot or cold intolerance  MUSCULOSKELETAL:  negative joint pains, muscle aches, swelling of joints  NEUROLOGICAL:  negative for headaches, dizziness, lightheadedness, numbness, pain, tingling extremities  BEHAVIOR/PSYCH:  negative for depression, anxiety    Physical Exam:   /75   Pulse 106   Temp 97.1 °F (36.2 °C) (Temporal)   Resp 18   Ht 5' 4\" (1.626 m)   Wt 158 lb (71.7 kg)   SpO2 98%   BMI 27.12 kg/m²   No LMP recorded. Patient is postmenopausal.    No results for input(s): POCGLU in the last 72 hours. General Appearance:  alert, well appearing, and in no acute distress  Mental status: oriented to person, place, and time with normal affect  Head:  normocephalic, atraumatic. Eye: no icterus, redness, pupils equal and reactive, extraocular eye movements intact, conjunctiva clear  Ear: normal external ear, no discharge, hearing intact  Nose:  no drainage noted  Mouth: mucous membranes moist  Neck: supple, no carotid bruits, thyroid not palpable  Lungs: Bilateral equal air entry, unlabored, no wheezing, rales or rhonchi, normal effort  Cardiovascular: Apical HR 92 normal rate, regular rhythm, no murmur, gallop, rub. Abdomen: Soft, nontender, nondistended, normal bowel sounds  Neurologic: There are no new focal motor or sensory deficits, normal muscle tone and bulk, no abnormal sensation, normal speech, cranial nerves II through XII grossly intact  Skin: No gross lesions, rashes, bruising or bleeding on exposed skin area  Extremities:  peripheral pulses palpable, no pedal edema or calf pain with palpation  Psych: normal affect     Investigations:      Laboratory Testing:  Recent Results (from the past 24 hour(s))   COVID-19, Rapid    Collection Time: 04/15/21  9:04 AM    Specimen: Nasopharyngeal Swab   Result Value Ref Range    Specimen Description . NASOPHARYNGEAL SWAB     SARS-CoV-2, Rapid Not Detected Not Detected       No results for input(s): HGB, HCT, WBC, MCV, PLATELET, NA, K, CL, CO2, BUN, CREATININE, GLUCOSE, INR, PROTIME, APTT, AST, ALT, LABALBU, HCG in the last 720 hours. Recent Labs     04/15/21  0904   COVID19 Not Detected     Imaging/Diagnostics:    No results found. Diagnosis:      1. Colon Screening    Plans:     1.  Colorectal cancer screening      CARLOS Celestin CNP  4/15/2021  9:52 AM

## 2021-04-15 NOTE — OP NOTE
COLONOSCOPY    DATE OF PROCEDURE: 4/15/2021    SURGEON: Lola Morfin MD    ASSISTANT: None    PREOPERATIVE DIAGNOSIS: Positive Cologuard test.  History of colon polyps    POSTOPERATIVE DIAGNOSIS: Small polyp in the right colon    OPERATION: Total colonoscopy, excision of polyp with biopsy 2 tubes x2    ANESTHESIA: MAC    ESTIMATED BLOOD LOSS: None    COMPLICATIONS: None     SPECIMENS:  Was Obtained: Polyps right colon    HISTORY: The patient is a 79y.o. year old female with history of above preop diagnosis. I recommended colonoscopy with possible biopsy or polypectomy and I explained the risk, benefits, expected outcome, and alternatives to the procedure. Risks included but are not limited to bleeding, infection, respiratory distress, hypotension, and perforation of the colon and possibility of missing a lesion. The patient understands and is in agreement. PROCEDURE:  The patient's SPO2 remained above 90% throughout the procedure. Digital rectal exam was normal.  The colonoscope was inserted through the anus into the rectum and advanced under direct vision to the cecum without difficulty. Terminal ileum was examined for approximately 2 inches. The prep was fair. Findings:  Terminal ileum: normal    Cecum/Ascending colon: abnormal: In the right colon there were 2 small polyps seen. These are about 2 to 3 mm, excised with biopsy forceps and kept in the same jar. Transverse colon: normal    Descending/Sigmoid colon: normal.  Has a spasms and the liquid stools. Rectum/Anus: examined in normal and retroflexed positions and was normal.  Had incompetent anal sphincter    Withdrawal Time was (minutes): 12          The colon was decompressed and the scope was removed. The patient tolerated the procedure without unusual events. During the procedure, the patient's blood pressure, pulse and oxygen saturation remained stable and documented. No unusual events occurred during the procedure. Patient was transferred to recovery room and will be discharged when criteria is met. Recommendations/Plan:   1. F/U Biopsies  2. F/U In Office as instructed  3. Discussed with the family  4. High fiber diet   5. Precautions to avoid constipation     Next colonoscopy: 3 years.   If Colonoscopy is less than 10 years the recommended reason is due:polyps, positive Cologuard test    Electronically signed by Aditya Moore MD  on 4/15/2021 at 10:48 AM

## 2021-04-15 NOTE — ANESTHESIA PRE PROCEDURE
Department of Anesthesiology  Preprocedure Note       Name:  Mili Torres   Age:  79 y.o.  :  1950                                          MRN:  [id number]         Date:  4/15/2021      Surgeon: Cindi Contreras):  Darrell Alvarenga MD    Procedure: Procedure(s):  COLORECTAL CANCER SCREENING, NOT HIGH RISK  EGD ESOPHAGOGASTRODUODENOSCOPY    Medications prior to admission:   Prior to Admission medications    Medication Sig Start Date End Date Taking? Authorizing Provider   polyethylene glycol (MIRALAX) 17 GM/SCOOP powder Use as Directed per instructions provided by physician office.  3/4/21  Yes Darrell Alvarenga MD   bisacodyl (BISACODYL) 5 MG EC tablet TAKE 2 TABS THE DAY BEFORE COLONOSCOPY AS DIRECTED ON BOWEL PREP INSTRUCTIONS GIVEN BY PHYSICIAN OFFICE 3/4/21  Yes Darrell Alvarenga MD   hydrochlorothiazide (HYDRODIURIL) 12.5 MG tablet Take 12.5 mg by mouth daily    Historical Provider, MD   potassium chloride (MICRO-K) 10 MEQ extended release capsule Take 10 mEq by mouth daily    Historical Provider, MD   calcium 600 MG TABS tablet Take 1 tablet by mouth daily    Historical Provider, MD       Current medications:    Current Facility-Administered Medications   Medication Dose Route Frequency Provider Last Rate Last Admin    [START ON 2021] lactated ringers infusion   Intravenous Continuous MD Eriberto Orourkerel Keiko [START ON 2021] lidocaine PF 1 % injection 1 mL  1 mL Intradermal Once PRN Heidi Murillo MD           Allergies:  No Known Allergies    Problem List:    Patient Active Problem List   Diagnosis Code    Macular hole of both eyes H35.343       Past Medical History:        Diagnosis Date    Blurred vision, right eye     Breast cancer ([facility] [address] 2013    left  radiation    Hypertension     pcp dr Loyola Pole hole, right eye     Wears dentures     full    Wears glasses     reading       Past Surgical History:        Procedure Laterality Date    BREAST BIOPSY      right GLUCOSE 115 03/22/2012    PROT 8.0 10/16/2020    CALCIUM 9.5 10/16/2020    BILITOT 0.30 10/16/2020    ALKPHOS 112 10/16/2020    AST 17 10/16/2020    ALT 18 10/16/2020       POC Tests: No results for input(s): POCGLU, POCNA, POCK, POCCL, POCBUN, POCHEMO, POCHCT in the last 72 hours. Coags: No results found for: PROTIME, INR, APTT    HCG (If Applicable): No results found for: PREGTESTUR, PREGSERUM, HCG, HCGQUANT     ABGs: No results found for: PHART, PO2ART, NOB2IZF, KYU7MFO, BEART, O5ZYAZEX     Type & Screen (If Applicable):  No results found for: LABABO, LABRH    Drug/Infectious Status (If Applicable):  No results found for: HIV, HEPCAB    COVID-19 Screening (If Applicable):   Lab Results   Component Value Date    COVID19 Indeterminate 04/11/2021           Anesthesia Evaluation  Patient summary reviewed and Nursing notes reviewed no history of anesthetic complications:   Airway: Mallampati: III  TM distance: >3 FB   Neck ROM: full  Mouth opening: > = 3 FB Dental:    (+) upper dentures and lower dentures      Pulmonary:normal exam  breath sounds clear to auscultation  (+) current smoker    (-) COPD, asthma and sleep apnea                           Cardiovascular:    (+) hypertension:,     (-) past MI, CAD, CABG/stent, dysrhythmias,  angina and  CHF    ECG reviewed  Rhythm: regular  Rate: normal                    Neuro/Psych:   Negative Neuro/Psych ROS     (-) seizures, TIA and CVA           GI/Hepatic/Renal: Neg GI/Hepatic/Renal ROS       (-) GERD, liver disease and no renal disease       Endo/Other: Negative Endo/Other ROS       (-) diabetes mellitus, hypothyroidism, hyperthyroidism               Abdominal:           Vascular:                                      Anesthesia Plan      MAC     ASA 2       Induction: intravenous. Anesthetic plan and risks discussed with patient. Plan discussed with CRNA.                   Glory Santoyo MD   4/15/2021

## 2021-04-15 NOTE — OP NOTE
unusual events. Recommendations/Plan:   1. F/U Biopsies  2. F/U In Office as instructed  3.  Discussed with the family                   Electronically signed by Meghana Arguelles MD  on 4/15/2021 at 10:23 AM

## 2021-04-19 LAB — SURGICAL PATHOLOGY REPORT: NORMAL

## 2021-05-10 ENCOUNTER — HOSPITAL ENCOUNTER (OUTPATIENT)
Dept: MAMMOGRAPHY | Age: 71
Discharge: HOME | End: 2021-05-12
Payer: MEDICARE

## 2021-05-10 DIAGNOSIS — Z12.31 ENCOUNTER FOR SCREENING MAMMOGRAM FOR BREAST CANCER: ICD-10-CM

## 2021-05-10 PROCEDURE — 77063 BREAST TOMOSYNTHESIS BI: CPT

## 2021-06-21 ENCOUNTER — OFFICE VISIT (OUTPATIENT)
Dept: GASTROENTEROLOGY | Age: 71
End: 2021-06-21
Payer: MEDICARE

## 2021-06-21 VITALS
WEIGHT: 159.2 LBS | DIASTOLIC BLOOD PRESSURE: 75 MMHG | HEIGHT: 64 IN | HEART RATE: 96 BPM | OXYGEN SATURATION: 98 % | SYSTOLIC BLOOD PRESSURE: 113 MMHG | BODY MASS INDEX: 27.18 KG/M2

## 2021-06-21 DIAGNOSIS — R19.5 POSITIVE COLORECTAL CANCER SCREENING USING COLOGUARD TEST: Primary | ICD-10-CM

## 2021-06-21 DIAGNOSIS — D36.9 TUBULAR ADENOMA: ICD-10-CM

## 2021-06-21 PROCEDURE — G8417 CALC BMI ABV UP PARAM F/U: HCPCS | Performed by: INTERNAL MEDICINE

## 2021-06-21 PROCEDURE — 4004F PT TOBACCO SCREEN RCVD TLK: CPT | Performed by: INTERNAL MEDICINE

## 2021-06-21 PROCEDURE — 99213 OFFICE O/P EST LOW 20 MIN: CPT | Performed by: INTERNAL MEDICINE

## 2021-06-21 PROCEDURE — 3017F COLORECTAL CA SCREEN DOC REV: CPT | Performed by: INTERNAL MEDICINE

## 2021-06-21 PROCEDURE — 1123F ACP DISCUSS/DSCN MKR DOCD: CPT | Performed by: INTERNAL MEDICINE

## 2021-06-21 PROCEDURE — G8427 DOCREV CUR MEDS BY ELIG CLIN: HCPCS | Performed by: INTERNAL MEDICINE

## 2021-06-21 PROCEDURE — 1090F PRES/ABSN URINE INCON ASSESS: CPT | Performed by: INTERNAL MEDICINE

## 2021-06-21 PROCEDURE — G8399 PT W/DXA RESULTS DOCUMENT: HCPCS | Performed by: INTERNAL MEDICINE

## 2021-06-21 PROCEDURE — 4040F PNEUMOC VAC/ADMIN/RCVD: CPT | Performed by: INTERNAL MEDICINE

## 2021-06-21 ASSESSMENT — ENCOUNTER SYMPTOMS
VOICE CHANGE: 0
CHOKING: 0
VOMITING: 0
BLOOD IN STOOL: 0
SORE THROAT: 0
BACK PAIN: 0
CONSTIPATION: 0
TROUBLE SWALLOWING: 0
ANAL BLEEDING: 0
RECTAL PAIN: 0
SHORTNESS OF BREATH: 0
ABDOMINAL DISTENTION: 1
DIARRHEA: 1
ABDOMINAL PAIN: 1
NAUSEA: 0
COUGH: 0

## 2021-06-21 NOTE — PROGRESS NOTES
GI OFFICE FOLLOW UP    INTERVAL HISTORY:   No referring provider defined for this encounter. Chief Complaint   Patient presents with    Follow-up     Patient is here today to f/u on colon and EGD 4/15/21       1. Positive colorectal cancer screening using Cologuard test    2. Tubular adenoma              HISTORY OF PRESENT ILLNESS: Tania Glaser is a 79 y.o. female with a past history remarkable for ,   Patient seen to discuss regarding EGD and colonoscopy findings. EGD revealed multiple gastric polyps, 3 of these polyps were excised and the histology revealed benign lesions. Colonoscopy revealed 2 polyps in the right colon, excised, histology revealed tubular adenomas. She had a fair preparation. At present she is doing well. Denies GI symptoms including dysphagia, GERD, dyspepsia. Has satisfactory bowel movements. Good appetite and there is no weight loss. Overall she is doing well. Past Medical,Family, and Social History reviewed and does contribute to the patient presenting condition. Patient's PMH/PSH,SH,PSYCH Hx, MEDs, ALLERGIES, and ROS were all reviewed and updated in the appropriate sections.  Yes      PAST MEDICAL HISTORY:  Past Medical History:   Diagnosis Date    Blurred vision, right eye     Breast cancer ([address] 2013    left  radiation    Hypertension     pcp dr Agnieszka Courtney hole, right eye     Wears dentures     full    Wears glasses     reading       Past Surgical History:   Procedure Laterality Date    BREAST BIOPSY      right breast    BREAST LUMPECTOMY Left 2013    CATARACT REMOVAL WITH IMPLANT Bilateral 07/2020    COLONOSCOPY  02/2009    COLONOSCOPY N/A 4/15/2021    COLONOSCOPY POLYPECTOMY SNARE/COLD BIOPSY performed by Lola Morfin MD at [facility]    removed from chest    TONSILLECTOMY AND ADENOIDECTOMY      TUBAL LIGATION  1990    UPPER GASTROINTESTINAL ENDOSCOPY N/A 4/15/2021    EGD BIOPSY performed by Darrell Alvarenga MD at [address] VITRECTOMY Right 12/17/2020    VITRECTOMY 25 GAUGE, AIR FLUID EXCHANGE, MEMBRANE PEEL, ICG (Right )    VITRECTOMY Right 12/17/2020    VITRECTOMY 25 GAUGE, AIR FLUID EXCHANGE, MEMBRANE PEEL, ICG, AIR GAS EXCHANGE 16%SF6 performed by Harriet Dillon MD at [address] [facility] [address]:    Current Outpatient Medications:     hydrochlorothiazide (HYDRODIURIL) 12.5 MG tablet, Take 12.5 mg by mouth daily, Disp: , Rfl:     potassium chloride (MICRO-K) 10 MEQ extended release capsule, Take 10 mEq by mouth daily, Disp: , Rfl:     calcium 600 MG TABS tablet, Take 1 tablet by mouth daily, Disp: , Rfl:     ALLERGIES:   No Known Allergies    FAMILY HISTORY:       Problem Relation Age of Onset    Hypertension Mother     Diabetes Mother     Breast Cancer Mother     Hypertension Sister     Cancer Sister         liver         SOCIAL HISTORY:   Social History     Socioeconomic History    Marital status:      Spouse name: Not on file    Number of children: Not on file    Years of education: Not on file    Highest education level: Not on file   Occupational History    Not on file   Tobacco Use    Smoking status: Current Every Day Smoker     Packs/day: 1.00     Years: 40.00     Pack years: 40.00    Smokeless tobacco: Never Used   Vaping Use    Vaping Use: Never used   Substance and Sexual Activity    Alcohol use: No    Drug use: No    Sexual activity: Not on file   Other Topics Concern    Not on file   Social History Narrative    Not on file     Social Determinants of Health     Financial Resource Strain:     Difficulty of Paying Living Expenses:    Food Insecurity:     Worried About Running Out of Food in the Last Year:     Ran Out of Food in the Last Year:    Transportation Needs:     Lack of Transportation (Medical):      Lack of Transportation (Non-Medical):    Physical Activity:     Days of Exercise per Week:     Minutes of Exercise per Session:    Stress:     Feeling of Stress :    Social Connections:     Frequency of Communication with Friends and Family:     Frequency of Social Gatherings with Friends and Family:     Attends Restorationist Services:     Active Member of Clubs or Organizations:     Attends Club or Organization Meetings:     Marital Status:    Intimate Partner Violence:     Fear of Current or Ex-Partner:     Emotionally Abused:     Physically Abused:     Sexually Abused:          REVIEW OF SYSTEMS:         Review of Systems   Constitutional: Negative for appetite change, fatigue and unexpected weight change. HENT: Negative for dental problem, sore throat, trouble swallowing and voice change. Eyes: Positive for visual disturbance (glasses). Respiratory: Negative for cough, choking and shortness of breath. Cardiovascular: Negative for chest pain and leg swelling. Gastrointestinal: Positive for abdominal distention (improved), abdominal pain (improved) and diarrhea (improved). Negative for anal bleeding, blood in stool, constipation, nausea, rectal pain and vomiting. Genitourinary: Negative for difficulty urinating. Musculoskeletal: Negative for back pain, joint swelling and myalgias. Neurological: Negative for dizziness, tremors, weakness, light-headedness, numbness and headaches. Hematological: Does not bruise/bleed easily. Psychiatric/Behavioral: Negative for sleep disturbance. The patient is not nervous/anxious. PHYSICAL EXAMINATION:     Vital signs reviewed per the nursing documentation. /75   Pulse 96   Ht 5' 4\" (1.626 m)   Wt 159 lb 3.2 oz (72.2 kg)   SpO2 98%   BMI 27.33 kg/m²   Body mass index is 27.33 kg/m². Physical Exam  Vitals and nursing note reviewed. Constitutional:       Appearance: She is well-developed. HENT:      Head: Normocephalic and atraumatic. Eyes:      General: No scleral icterus. Conjunctiva/sclera: Conjunctivae normal.      Pupils: Pupils are equal, round, and reactive to light. Neck:      Thyroid: No thyromegaly. Vascular: No hepatojugular reflux or JVD. Trachea: No tracheal deviation. Cardiovascular:      Rate and Rhythm: Normal rate and regular rhythm. Heart sounds: Normal heart sounds. Pulmonary:      Effort: Pulmonary effort is normal. No respiratory distress. Breath sounds: Normal breath sounds. No wheezing or rales. Abdominal:      General: Bowel sounds are normal. There is no distension. Palpations: Abdomen is soft. There is no hepatomegaly or mass. Tenderness: There is no abdominal tenderness. There is no rebound. Hernia: No hernia is present. Musculoskeletal:         General: No tenderness. Cervical back: Normal range of motion and neck supple. Comments: No joint swelling   Lymphadenopathy:      Cervical: No cervical adenopathy. Skin:     General: Skin is warm. Findings: No bruising, ecchymosis, erythema or rash. Neurological:      Mental Status: She is alert and oriented to person, place, and time. Cranial Nerves: No cranial nerve deficit. Psychiatric:         Thought Content:  Thought content normal.           LABORATORY DATA: Reviewed  Lab Results   Component Value Date    WBC 7.0 12/15/2016    HGB 14.0 12/15/2016    HCT 40.5 12/15/2016    MCV 86.8 12/15/2016     12/15/2016     10/16/2020    K 4.0 10/16/2020     10/16/2020    CO2 23 10/16/2020    BUN 9 10/16/2020    CREATININE 0.75 12/17/2020    LABALBU 3.9 10/16/2020    BILITOT 0.30 10/16/2020    ALKPHOS 112 (H) 10/16/2020    AST 17 10/16/2020    ALT 18 10/16/2020         Lab Results   Component Value Date    RBC 4.67 12/15/2016    HGB 14.0 12/15/2016    MCV 86.8 12/15/2016    MCH 30.0 12/15/2016    MCHC 34.6 12/15/2016    RDW 15.3 12/15/2016    MPV 9.3 12/15/2016    BASOPCT 0 12/15/2016 LYMPHSABS 1.10 12/15/2016    MONOSABS 0.80 12/15/2016    NEUTROABS 5.10 12/15/2016    EOSABS 0.10 12/15/2016    BASOSABS 0.00 12/15/2016         DIAGNOSTIC TESTING:     No results found. Assessment  1. Positive colorectal cancer screening using Cologuard test    2. Tubular adenoma        Plan  Patient is advised to high-fiber diet, fiber supplements and precautions to avoid constipation. If he has any further issues to contact me. Otherwise we will see her in the next 3 years. As she is having a recurrent colon polyps may need colonoscopy in the next 3 to 5 years time. After above discussion, patient understood and agreed. Thank you for allowing me to participate in the care of Ms. Juaquin Doyle. For any further questions please do not hesitate to contact me. I have reviewed and agree with the ROS entered by the MA/LPN. Note is dictated utilizing voice recognition software. Unfortunately this leads to occasional typographical errors.  Please contact our office if you have any questions        London Hale MD,FACP, [facility]  Board Certified in Gastroenterology and [address] [place] [address] Gastroenterology  Office #: [phone]

## 2021-07-08 ENCOUNTER — HOSPITAL ENCOUNTER (OUTPATIENT)
Age: 71
Discharge: HOME | End: 2021-07-08
Payer: MEDICARE

## 2021-07-08 LAB
ABSOLUTE EOS #: 0.19 K/UL (ref 0–0.44)
ABSOLUTE IMMATURE GRANULOCYTE: 0.03 K/UL (ref 0–0.3)
ABSOLUTE LYMPH #: 2.62 K/UL (ref 1.1–3.7)
ABSOLUTE MONO #: 0.9 K/UL (ref 0.1–1.2)
ALBUMIN SERPL-MCNC: 3.6 G/DL (ref 3.5–5.2)
ALBUMIN/GLOBULIN RATIO: 0.8 (ref 1–2.5)
ALP BLD-CCNC: 119 U/L (ref 35–104)
ALT SERPL-CCNC: 16 U/L (ref 5–33)
ANION GAP SERPL CALCULATED.3IONS-SCNC: 11 MMOL/L (ref 9–17)
AST SERPL-CCNC: 17 U/L
BASOPHILS # BLD: 0 % (ref 0–2)
BASOPHILS ABSOLUTE: 0.04 K/UL (ref 0–0.2)
BILIRUB SERPL-MCNC: 0.33 MG/DL (ref 0.3–1.2)
BUN BLDV-MCNC: 12 MG/DL (ref 8–23)
BUN/CREAT BLD: ABNORMAL (ref 9–20)
CALCIUM SERPL-MCNC: 9.5 MG/DL (ref 8.6–10.4)
CHLORIDE BLD-SCNC: 103 MMOL/L (ref 98–107)
CHOLESTEROL/HDL RATIO: 3.3
CHOLESTEROL: 135 MG/DL
CO2: 24 MMOL/L (ref 20–31)
CREAT SERPL-MCNC: 0.68 MG/DL (ref 0.5–0.9)
DIFFERENTIAL TYPE: ABNORMAL
EOSINOPHILS RELATIVE PERCENT: 2 % (ref 1–4)
GFR AFRICAN AMERICAN: >60 ML/MIN
GFR NON-AFRICAN AMERICAN: >60 ML/MIN
GFR SERPL CREATININE-BSD FRML MDRD: ABNORMAL ML/MIN/{1.73_M2}
GFR SERPL CREATININE-BSD FRML MDRD: ABNORMAL ML/MIN/{1.73_M2}
GLUCOSE BLD-MCNC: 109 MG/DL (ref 70–99)
HCT VFR BLD CALC: 40.3 % (ref 36.3–47.1)
HDLC SERPL-MCNC: 41 MG/DL
HEMOGLOBIN: 14.1 G/DL (ref 11.9–15.1)
IMMATURE GRANULOCYTES: 0 %
LDL CHOLESTEROL: 55 MG/DL (ref 0–130)
LYMPHOCYTES # BLD: 24 % (ref 24–43)
MCH RBC QN AUTO: 29.6 PG (ref 25.2–33.5)
MCHC RBC AUTO-ENTMCNC: 35 G/DL (ref 28.4–34.8)
MCV RBC AUTO: 84.5 FL (ref 82.6–102.9)
MONOCYTES # BLD: 8 % (ref 3–12)
NRBC AUTOMATED: 0 PER 100 WBC
PDW BLD-RTO: 13.6 % (ref 11.8–14.4)
PLATELET # BLD: 230 K/UL (ref 138–453)
PLATELET ESTIMATE: ABNORMAL
PMV BLD AUTO: 11.1 FL (ref 8.1–13.5)
POTASSIUM SERPL-SCNC: 4 MMOL/L (ref 3.7–5.3)
RBC # BLD: 4.77 M/UL (ref 3.95–5.11)
RBC # BLD: ABNORMAL 10*6/UL
SEG NEUTROPHILS: 66 % (ref 36–65)
SEGMENTED NEUTROPHILS ABSOLUTE COUNT: 7.3 K/UL (ref 1.5–8.1)
SODIUM BLD-SCNC: 138 MMOL/L (ref 135–144)
TOTAL PROTEIN: 8.1 G/DL (ref 6.4–8.3)
TRIGL SERPL-MCNC: 193 MG/DL
VLDLC SERPL CALC-MCNC: ABNORMAL MG/DL (ref 1–30)
WBC # BLD: 11.1 K/UL (ref 3.5–11.3)
WBC # BLD: ABNORMAL 10*3/UL

## 2021-07-08 PROCEDURE — 80053 COMPREHEN METABOLIC PANEL: CPT

## 2021-07-08 PROCEDURE — 80061 LIPID PANEL: CPT

## 2021-07-08 PROCEDURE — 36415 COLL VENOUS BLD VENIPUNCTURE: CPT

## 2021-07-08 PROCEDURE — 85025 COMPLETE CBC W/AUTO DIFF WBC: CPT

## 2021-08-21 ENCOUNTER — APPOINTMENT (OUTPATIENT)
Dept: GENERAL RADIOLOGY | Age: 71
DRG: 418 | End: 2021-08-21
Payer: MEDICARE

## 2021-08-21 ENCOUNTER — HOSPITAL ENCOUNTER (INPATIENT)
Age: 71
LOS: 2 days | Discharge: HOME | DRG: 418 | End: 2021-08-23
Attending: EMERGENCY MEDICINE | Admitting: SURGERY
Payer: MEDICARE

## 2021-08-21 ENCOUNTER — APPOINTMENT (OUTPATIENT)
Dept: CT IMAGING | Age: 71
DRG: 418 | End: 2021-08-21
Payer: MEDICARE

## 2021-08-21 ENCOUNTER — APPOINTMENT (OUTPATIENT)
Dept: ULTRASOUND IMAGING | Age: 71
DRG: 418 | End: 2021-08-21
Payer: MEDICARE

## 2021-08-21 DIAGNOSIS — K81.9 CHOLECYSTITIS: Primary | ICD-10-CM

## 2021-08-21 PROBLEM — K81.0 ACUTE CHOLECYSTITIS: Status: ACTIVE | Noted: 2021-08-21

## 2021-08-21 LAB
-: ABNORMAL
ABSOLUTE EOS #: 0.15 K/UL (ref 0–0.44)
ABSOLUTE IMMATURE GRANULOCYTE: 0.07 K/UL (ref 0–0.3)
ABSOLUTE LYMPH #: 2.47 K/UL (ref 1.1–3.7)
ABSOLUTE MONO #: 1.28 K/UL (ref 0.1–1.2)
ALBUMIN SERPL-MCNC: 4 G/DL (ref 3.5–5.2)
ALBUMIN/GLOBULIN RATIO: 0.9 (ref 1–2.5)
ALP BLD-CCNC: 121 U/L (ref 35–104)
ALT SERPL-CCNC: 14 U/L (ref 5–33)
AMORPHOUS: ABNORMAL
ANION GAP SERPL CALCULATED.3IONS-SCNC: 13 MMOL/L (ref 9–17)
AST SERPL-CCNC: 14 U/L
BACTERIA: ABNORMAL
BASOPHILS # BLD: 0 % (ref 0–2)
BASOPHILS ABSOLUTE: 0.07 K/UL (ref 0–0.2)
BILIRUB SERPL-MCNC: 0.44 MG/DL (ref 0.3–1.2)
BILIRUBIN URINE: NEGATIVE
BNP INTERPRETATION: NORMAL
BUN BLDV-MCNC: 8 MG/DL (ref 8–23)
BUN/CREAT BLD: ABNORMAL (ref 9–20)
CALCIUM SERPL-MCNC: 9.6 MG/DL (ref 8.6–10.4)
CASTS UA: ABNORMAL /LPF (ref 0–2)
CHLORIDE BLD-SCNC: 100 MMOL/L (ref 98–107)
CO2: 24 MMOL/L (ref 20–31)
COLOR: YELLOW
COMMENT UA: ABNORMAL
CREAT SERPL-MCNC: 0.65 MG/DL (ref 0.5–0.9)
CRYSTALS, UA: ABNORMAL /HPF
DIFFERENTIAL TYPE: ABNORMAL
EKG ATRIAL RATE: 102 BPM
EKG P AXIS: 77 DEGREES
EKG P-R INTERVAL: 120 MS
EKG Q-T INTERVAL: 346 MS
EKG QRS DURATION: 82 MS
EKG QTC CALCULATION (BAZETT): 450 MS
EKG R AXIS: 68 DEGREES
EKG T AXIS: 81 DEGREES
EKG VENTRICULAR RATE: 102 BPM
EOSINOPHILS RELATIVE PERCENT: 1 % (ref 1–4)
EPITHELIAL CELLS UA: ABNORMAL /HPF (ref 0–5)
GFR AFRICAN AMERICAN: >60 ML/MIN
GFR NON-AFRICAN AMERICAN: >60 ML/MIN
GFR SERPL CREATININE-BSD FRML MDRD: ABNORMAL ML/MIN/{1.73_M2}
GFR SERPL CREATININE-BSD FRML MDRD: ABNORMAL ML/MIN/{1.73_M2}
GLUCOSE BLD-MCNC: 101 MG/DL (ref 70–99)
GLUCOSE URINE: NEGATIVE
HCT VFR BLD CALC: 44.3 % (ref 36.3–47.1)
HEMOGLOBIN: 15.6 G/DL (ref 11.9–15.1)
IMMATURE GRANULOCYTES: 0 %
KETONES, URINE: NEGATIVE
LACTIC ACID, WHOLE BLOOD: 1.3 MMOL/L (ref 0.7–2.1)
LACTIC ACID: NORMAL MMOL/L
LEUKOCYTE ESTERASE, URINE: ABNORMAL
LIPASE: 27 U/L (ref 13–60)
LYMPHOCYTES # BLD: 15 % (ref 24–43)
MCH RBC QN AUTO: 29.4 PG (ref 25.2–33.5)
MCHC RBC AUTO-ENTMCNC: 35.2 G/DL (ref 28.4–34.8)
MCV RBC AUTO: 83.6 FL (ref 82.6–102.9)
MONOCYTES # BLD: 8 % (ref 3–12)
MUCUS: ABNORMAL
NITRITE, URINE: NEGATIVE
NRBC AUTOMATED: 0 PER 100 WBC
OTHER OBSERVATIONS UA: ABNORMAL
PDW BLD-RTO: 13.2 % (ref 11.8–14.4)
PH UA: 8 (ref 5–8)
PLATELET # BLD: 256 K/UL (ref 138–453)
PLATELET ESTIMATE: ABNORMAL
PMV BLD AUTO: 10.5 FL (ref 8.1–13.5)
POTASSIUM SERPL-SCNC: 4 MMOL/L (ref 3.7–5.3)
PRO-BNP: 136 PG/ML
PROTEIN UA: ABNORMAL
RBC # BLD: 5.3 M/UL (ref 3.95–5.11)
RBC # BLD: ABNORMAL 10*6/UL
RBC UA: ABNORMAL /HPF (ref 0–2)
RENAL EPITHELIAL, UA: ABNORMAL /HPF
SARS-COV-2, RAPID: NOT DETECTED
SEG NEUTROPHILS: 76 % (ref 36–65)
SEGMENTED NEUTROPHILS ABSOLUTE COUNT: 12.56 K/UL (ref 1.5–8.1)
SODIUM BLD-SCNC: 137 MMOL/L (ref 135–144)
SPECIFIC GRAVITY UA: 1.02 (ref 1–1.03)
SPECIMEN DESCRIPTION: NORMAL
TOTAL PROTEIN: 8.7 G/DL (ref 6.4–8.3)
TRICHOMONAS: ABNORMAL
TROPONIN INTERP: NORMAL
TROPONIN T: NORMAL NG/ML
TROPONIN, HIGH SENSITIVITY: 7 NG/L (ref 0–14)
TURBIDITY: ABNORMAL
URINE HGB: ABNORMAL
UROBILINOGEN, URINE: NORMAL
WBC # BLD: 16.6 K/UL (ref 3.5–11.3)
WBC # BLD: ABNORMAL 10*3/UL
WBC UA: ABNORMAL /HPF (ref 0–5)
YEAST: ABNORMAL

## 2021-08-21 PROCEDURE — 85025 COMPLETE CBC W/AUTO DIFF WBC: CPT

## 2021-08-21 PROCEDURE — 2580000003 HC RX 258: Performed by: EMERGENCY MEDICINE

## 2021-08-21 PROCEDURE — 99283 EMERGENCY DEPT VISIT LOW MDM: CPT

## 2021-08-21 PROCEDURE — G0378 HOSPITAL OBSERVATION PER HR: HCPCS

## 2021-08-21 PROCEDURE — 83880 ASSAY OF NATRIURETIC PEPTIDE: CPT

## 2021-08-21 PROCEDURE — 36415 COLL VENOUS BLD VENIPUNCTURE: CPT

## 2021-08-21 PROCEDURE — 76705 ECHO EXAM OF ABDOMEN: CPT

## 2021-08-21 PROCEDURE — 80053 COMPREHEN METABOLIC PANEL: CPT

## 2021-08-21 PROCEDURE — 71046 X-RAY EXAM CHEST 2 VIEWS: CPT

## 2021-08-21 PROCEDURE — 81001 URINALYSIS AUTO W/SCOPE: CPT

## 2021-08-21 PROCEDURE — 93005 ELECTROCARDIOGRAM TRACING: CPT | Performed by: STUDENT IN AN ORGANIZED HEALTH CARE EDUCATION/TRAINING PROGRAM

## 2021-08-21 PROCEDURE — 93010 ELECTROCARDIOGRAM REPORT: CPT | Performed by: INTERNAL MEDICINE

## 2021-08-21 PROCEDURE — 6360000004 HC RX CONTRAST MEDICATION: Performed by: STUDENT IN AN ORGANIZED HEALTH CARE EDUCATION/TRAINING PROGRAM

## 2021-08-21 PROCEDURE — 87635 SARS-COV-2 COVID-19 AMP PRB: CPT

## 2021-08-21 PROCEDURE — 6360000002 HC RX W HCPCS: Performed by: STUDENT IN AN ORGANIZED HEALTH CARE EDUCATION/TRAINING PROGRAM

## 2021-08-21 PROCEDURE — 96365 THER/PROPH/DIAG IV INF INIT: CPT

## 2021-08-21 PROCEDURE — 83690 ASSAY OF LIPASE: CPT

## 2021-08-21 PROCEDURE — 74177 CT ABD & PELVIS W/CONTRAST: CPT

## 2021-08-21 PROCEDURE — 96375 TX/PRO/DX INJ NEW DRUG ADDON: CPT

## 2021-08-21 PROCEDURE — 2500000003 HC RX 250 WO HCPCS: Performed by: GENERAL PRACTICE

## 2021-08-21 PROCEDURE — 96376 TX/PRO/DX INJ SAME DRUG ADON: CPT

## 2021-08-21 PROCEDURE — 6370000000 HC RX 637 (ALT 250 FOR IP): Performed by: STUDENT IN AN ORGANIZED HEALTH CARE EDUCATION/TRAINING PROGRAM

## 2021-08-21 PROCEDURE — 2580000003 HC RX 258: Performed by: STUDENT IN AN ORGANIZED HEALTH CARE EDUCATION/TRAINING PROGRAM

## 2021-08-21 PROCEDURE — 6360000002 HC RX W HCPCS: Performed by: GENERAL PRACTICE

## 2021-08-21 PROCEDURE — 6360000002 HC RX W HCPCS: Performed by: EMERGENCY MEDICINE

## 2021-08-21 PROCEDURE — 99284 EMERGENCY DEPT VISIT MOD MDM: CPT

## 2021-08-21 PROCEDURE — 84484 ASSAY OF TROPONIN QUANT: CPT

## 2021-08-21 PROCEDURE — 87040 BLOOD CULTURE FOR BACTERIA: CPT

## 2021-08-21 PROCEDURE — 87086 URINE CULTURE/COLONY COUNT: CPT

## 2021-08-21 PROCEDURE — 1200000000 HC SEMI PRIVATE

## 2021-08-21 PROCEDURE — 74018 RADEX ABDOMEN 1 VIEW: CPT

## 2021-08-21 PROCEDURE — 96361 HYDRATE IV INFUSION ADD-ON: CPT

## 2021-08-21 PROCEDURE — 83605 ASSAY OF LACTIC ACID: CPT

## 2021-08-21 RX ORDER — IBUPROFEN 400 MG/1
400 TABLET ORAL EVERY 6 HOURS PRN
Status: DISCONTINUED | OUTPATIENT
Start: 2021-08-21 | End: 2021-08-23 | Stop reason: HOSPADM

## 2021-08-21 RX ORDER — MORPHINE SULFATE 4 MG/ML
4 INJECTION, SOLUTION INTRAMUSCULAR; INTRAVENOUS ONCE
Status: COMPLETED | OUTPATIENT
Start: 2021-08-21 | End: 2021-08-21

## 2021-08-21 RX ORDER — SODIUM CHLORIDE 0.9 % (FLUSH) 0.9 %
5-40 SYRINGE (ML) INJECTION PRN
Status: DISCONTINUED | OUTPATIENT
Start: 2021-08-21 | End: 2021-08-23 | Stop reason: HOSPADM

## 2021-08-21 RX ORDER — SODIUM CHLORIDE 0.9 % (FLUSH) 0.9 %
5-40 SYRINGE (ML) INJECTION EVERY 12 HOURS SCHEDULED
Status: DISCONTINUED | OUTPATIENT
Start: 2021-08-21 | End: 2021-08-23 | Stop reason: HOSPADM

## 2021-08-21 RX ORDER — 0.9 % SODIUM CHLORIDE 0.9 %
1000 INTRAVENOUS SOLUTION INTRAVENOUS ONCE
Status: DISCONTINUED | OUTPATIENT
Start: 2021-08-21 | End: 2021-08-21

## 2021-08-21 RX ORDER — ONDANSETRON 2 MG/ML
4 INJECTION INTRAMUSCULAR; INTRAVENOUS EVERY 6 HOURS PRN
Status: DISCONTINUED | OUTPATIENT
Start: 2021-08-21 | End: 2021-08-23 | Stop reason: HOSPADM

## 2021-08-21 RX ORDER — BISACODYL 10 MG
10 SUPPOSITORY, RECTAL RECTAL DAILY PRN
Status: DISCONTINUED | OUTPATIENT
Start: 2021-08-21 | End: 2021-08-23 | Stop reason: HOSPADM

## 2021-08-21 RX ORDER — ONDANSETRON 4 MG/1
4 TABLET, ORALLY DISINTEGRATING ORAL EVERY 8 HOURS PRN
Status: DISCONTINUED | OUTPATIENT
Start: 2021-08-21 | End: 2021-08-23 | Stop reason: HOSPADM

## 2021-08-21 RX ORDER — MORPHINE SULFATE 4 MG/ML
2 INJECTION, SOLUTION INTRAMUSCULAR; INTRAVENOUS ONCE
Status: COMPLETED | OUTPATIENT
Start: 2021-08-21 | End: 2021-08-21

## 2021-08-21 RX ORDER — SODIUM CHLORIDE 9 MG/ML
25 INJECTION, SOLUTION INTRAVENOUS PRN
Status: DISCONTINUED | OUTPATIENT
Start: 2021-08-21 | End: 2021-08-23 | Stop reason: HOSPADM

## 2021-08-21 RX ORDER — POLYETHYLENE GLYCOL 3350 17 G/17G
17 POWDER, FOR SOLUTION ORAL DAILY
Status: DISCONTINUED | OUTPATIENT
Start: 2021-08-21 | End: 2021-08-23 | Stop reason: HOSPADM

## 2021-08-21 RX ORDER — 0.9 % SODIUM CHLORIDE 0.9 %
2000 INTRAVENOUS SOLUTION INTRAVENOUS ONCE
Status: COMPLETED | OUTPATIENT
Start: 2021-08-21 | End: 2021-08-21

## 2021-08-21 RX ORDER — ACETAMINOPHEN 500 MG
1000 TABLET ORAL EVERY 8 HOURS SCHEDULED
Status: DISCONTINUED | OUTPATIENT
Start: 2021-08-21 | End: 2021-08-23 | Stop reason: HOSPADM

## 2021-08-21 RX ORDER — POLYETHYLENE GLYCOL 3350 17 G/17G
17 POWDER, FOR SOLUTION ORAL DAILY
Status: DISCONTINUED | OUTPATIENT
Start: 2021-08-21 | End: 2021-08-21 | Stop reason: SDUPTHER

## 2021-08-21 RX ORDER — SODIUM CHLORIDE, SODIUM LACTATE, POTASSIUM CHLORIDE, CALCIUM CHLORIDE 600; 310; 30; 20 MG/100ML; MG/100ML; MG/100ML; MG/100ML
INJECTION, SOLUTION INTRAVENOUS CONTINUOUS
Status: DISCONTINUED | OUTPATIENT
Start: 2021-08-21 | End: 2021-08-23

## 2021-08-21 RX ADMIN — SODIUM CHLORIDE, POTASSIUM CHLORIDE, SODIUM LACTATE AND CALCIUM CHLORIDE: 600; 310; 30; 20 INJECTION, SOLUTION INTRAVENOUS at 15:23

## 2021-08-21 RX ADMIN — SODIUM CHLORIDE 2000 ML: 9 INJECTION, SOLUTION INTRAVENOUS at 06:48

## 2021-08-21 RX ADMIN — MORPHINE SULFATE 2 MG: 4 INJECTION INTRAVENOUS at 04:21

## 2021-08-21 RX ADMIN — CEFTRIAXONE SODIUM 1000 MG: 1 INJECTION, POWDER, FOR SOLUTION INTRAMUSCULAR; INTRAVENOUS at 06:24

## 2021-08-21 RX ADMIN — METRONIDAZOLE 500 MG: 500 INJECTION, SOLUTION INTRAVENOUS at 10:41

## 2021-08-21 RX ADMIN — ACETAMINOPHEN 1000 MG: 500 TABLET ORAL at 15:23

## 2021-08-21 RX ADMIN — IOPAMIDOL 75 ML: 755 INJECTION, SOLUTION INTRAVENOUS at 04:33

## 2021-08-21 RX ADMIN — MORPHINE SULFATE 4 MG: 4 INJECTION INTRAVENOUS at 08:53

## 2021-08-21 RX ADMIN — MORPHINE SULFATE 4 MG: 4 INJECTION INTRAVENOUS at 10:54

## 2021-08-21 ASSESSMENT — PAIN SCALES - GENERAL
PAINLEVEL_OUTOF10: 5
PAINLEVEL_OUTOF10: 5
PAINLEVEL_OUTOF10: 0
PAINLEVEL_OUTOF10: 5
PAINLEVEL_OUTOF10: 5

## 2021-08-21 ASSESSMENT — PAIN DESCRIPTION - DESCRIPTORS: DESCRIPTORS: TINGLING

## 2021-08-21 ASSESSMENT — PAIN DESCRIPTION - LOCATION: LOCATION: ABDOMEN

## 2021-08-21 NOTE — ED PROVIDER NOTES
Activity    Alcohol use: No    Drug use: No    Sexual activity: Not on file   Other Topics Concern    Not on file   Social History Narrative    Not on file     Social Determinants of Health     Financial Resource Strain:     Difficulty of Paying Living Expenses:    Food Insecurity:     Worried About Running Out of Food in the Last Year:     [address] in the Last Year:    Transportation Needs:     Lack of Transportation (Medical):  Lack of Transportation (Non-Medical):    Physical Activity:     Days of Exercise per Week:     Minutes of Exercise per Session:    Stress:     Feeling of Stress :    Social Connections:     Frequency of Communication with Friends and Family:     Frequency of Social Gatherings with Friends and Family:     Attends Restorationist Services:     Active Member of Clubs or Organizations:     Attends Club or Organization Meetings:     Marital Status:    Intimate Partner Violence:     Fear of Current or Ex-Partner:     Emotionally Abused:     Physically Abused:     Sexually Abused:        Family History   Problem Relation Age of Onset    Hypertension Mother     Diabetes Mother     Breast Cancer Mother     Hypertension Sister     Cancer Sister         liver       Allergies:  Patient has no known allergies. Home Medications:  Prior to Admission medications    Medication Sig Start Date End Date Taking? Authorizing Provider   hydrochlorothiazide (HYDRODIURIL) 12.5 MG tablet Take 12.5 mg by mouth daily    Historical Provider, MD   potassium chloride (MICRO-K) 10 MEQ extended release capsule Take 10 mEq by mouth daily    Historical Provider, MD   calcium 600 MG TABS tablet Take 1 tablet by mouth daily    Historical Provider, MD       REVIEW OF SYSTEMS    (2-9 systems for level 4, 10 or more for level 5)      Review of Systems   Constitutional: Negative for fever. HENT: Negative for congestion. Eyes: Negative for photophobia.    Respiratory: Negative for shortness of breath. Cardiovascular: Negative for chest pain. Gastrointestinal: Positive for abdominal pain  Endocrine: Negative for polyuria. Genitourinary: Negative for dysuria. Musculoskeletal: Negative for arthralgias. Skin: Negative for color change. Allergic/Immunologic: Negative for immunocompromised state. Neurological: Negative for dizziness. Hematological: Does not bruise/bleed easily. Psychiatric/Behavioral: Negative for agitation. PHYSICAL EXAM   (up to 7 for level 4, 8 or more for level 5)      INITIAL VITALS:   BP (!) 157/82   Pulse 99   Temp 99.2 °F (37.3 °C)   Resp 16   Ht 5' 4\" (1.626 m)   Wt 160 lb (72.6 kg)   SpO2 95%   BMI 27.46 kg/m²     Physical Exam  Constitutional:       General: Not in acute distress. Appearance: Normal appearance. Normal weight. Not toxic-appearing. HENT:      Head: Normocephalic and atraumatic. Nose: Nose normal.      Mouth/Throat: Mucous membranes are moist.  Uvula midline no oropharyngeal edema. Pharynx: Oropharynx is clear. Eyes:      Extraocular Movements: Extraocular movements intact. Conjunctiva/sclera: Conjunctivae normal.      Pupils: Pupils are equal, round, and reactive to light. Neck:      Musculoskeletal: Normal range of motion and neck supple. No neck rigidity. Cardiovascular:      Rate and Rhythm: Normal rate and regular rhythm. Pulses: Normal pulses. Heart sounds: Normal heart sounds. No murmur. Pulmonary:      Effort: Pulmonary effort is normal.      Breath sounds: Normal breath sounds. No wheezing. Abdominal:      General: Abdomen is flat. Bowel sounds are normal.      Tenderness: Right upper quadrant tenderness palpation without guarding rebound or rigidity    Musculoskeletal:     Normal range of motion. General: No swelling or tenderness. No LE edema    Skin:     General: Skin is warm. Capillary Refill: Capillary refill takes less than 2 seconds.       Coloration: Skin is not jaundiced. Neurological:      General: No focal deficit present. Mental Status: Alert and oriented to person, place, and time. Mental status is at baseline. Motor: No weakness.        DIFFERENTIAL  DIAGNOSIS     PLAN (LABS / IMAGING / EKG):  Orders Placed This Encounter   Procedures    Culture, Urine    Culture, Blood 1    Culture, Blood 1    XR CHEST (2 VW)    XR ABDOMEN (KUB) (SINGLE AP VIEW)    CT ABDOMEN PELVIS W IV CONTRAST Additional Contrast? None    US GALLBLADDER RUQ    CBC Auto Differential    Comprehensive Metabolic Panel w/ Reflex to MG    Lipase    Brain Natriuretic Peptide    Troponin    Urinalysis Reflex to Culture    Lactic Acid, Plasma    Microscopic Urinalysis    Inpatient consult to General Surgery    EKG 12 Lead    Insert peripheral IV       MEDICATIONS ORDERED:  Orders Placed This Encounter   Medications    magnesium hydroxide (MILK OF MAGNESIA) 400 MG/5ML suspension 30 mL    bisacodyl (DULCOLAX) suppository 10 mg    polyethylene glycol (GLYCOLAX) packet 17 g    morphine injection 2 mg    iopamidol (ISOVUE-370) 76 % injection 75 mL    cefTRIAXone (ROCEPHIN) 1000 mg IVPB in 50 mL D5W minibag     Order Specific Question:   Antimicrobial Indications     Answer:   Urinary Tract Infection    0.9 % sodium chloride bolus           DIAGNOSTIC RESULTS / EMERGENCY DEPARTMENT COURSE / MDM     LABS:  Results for orders placed or performed during the hospital encounter of 08/21/21   CBC Auto Differential   Result Value Ref Range    WBC 16.6 (H) 3.5 - 11.3 k/uL    RBC 5.30 (H) 3.95 - 5.11 m/uL    Hemoglobin 15.6 (H) 11.9 - 15.1 g/dL    Hematocrit 44.3 36.3 - 47.1 %    MCV 83.6 82.6 - 102.9 fL    MCH 29.4 25.2 - 33.5 pg    MCHC 35.2 (H) 28.4 - 34.8 g/dL    RDW 13.2 11.8 - 14.4 %    Platelets 937 888 - 842 k/uL    MPV 10.5 8.1 - 13.5 fL    NRBC Automated 0.0 0.0 per 100 WBC    Differential Type NOT REPORTED     Seg Neutrophils 76 (H) 36 - 65 %    Lymphocytes 15 (L) 24 - 43 %    Monocytes 8 3 - 12 %    Eosinophils % 1 1 - 4 %    Basophils 0 0 - 2 %    Immature Granulocytes 0 0 %    Segs Absolute 12.56 (H) 1.50 - 8.10 k/uL    Absolute Lymph # 2.47 1.10 - 3.70 k/uL    Absolute Mono # 1.28 (H) 0.10 - 1.20 k/uL    Absolute Eos # 0.15 0.00 - 0.44 k/uL    Basophils Absolute 0.07 0.00 - 0.20 k/uL    Absolute Immature Granulocyte 0.07 0.00 - 0.30 k/uL    WBC Morphology NOT REPORTED     RBC Morphology NOT REPORTED     Platelet Estimate NOT REPORTED    Comprehensive Metabolic Panel w/ Reflex to MG   Result Value Ref Range    Glucose 101 (H) 70 - 99 mg/dL    BUN 8 8 - 23 mg/dL    CREATININE 0.65 0.50 - 0.90 mg/dL    Bun/Cre Ratio NOT REPORTED 9 - 20    Calcium 9.6 8.6 - 10.4 mg/dL    Sodium 137 135 - 144 mmol/L    Potassium 4.0 3.7 - 5.3 mmol/L    Chloride 100 98 - 107 mmol/L    CO2 24 20 - 31 mmol/L    Anion Gap 13 9 - 17 mmol/L    Alkaline Phosphatase 121 (H) 35 - 104 U/L    ALT 14 5 - 33 U/L    AST 14 <32 U/L    Total Bilirubin 0.44 0.3 - 1.2 mg/dL    Total Protein 8.7 (H) 6.4 - 8.3 g/dL    Albumin 4.0 3.5 - 5.2 g/dL    Albumin/Globulin Ratio 0.9 (L) 1.0 - 2.5    GFR Non-African American >60 >60 mL/min    GFR African American >60 >60 mL/min    GFR Comment          GFR Staging NOT REPORTED    Lipase   Result Value Ref Range    Lipase 27 13 - 60 U/L   Brain Natriuretic Peptide   Result Value Ref Range    Pro- <300 pg/mL    BNP Interpretation Pro-BNP Reference Range:    Troponin   Result Value Ref Range    Troponin, High Sensitivity 7 0 - 14 ng/L    Troponin T NOT REPORTED <0.03 ng/mL    Troponin Interp NOT REPORTED    Urinalysis Reflex to Culture    Specimen: Urine, clean catch   Result Value Ref Range    Color, UA YELLOW YELLOW    Turbidity UA TURBID (A) CLEAR    Glucose, Ur NEGATIVE NEGATIVE    Bilirubin Urine NEGATIVE NEGATIVE    Ketones, Urine NEGATIVE NEGATIVE    Specific Gravity, UA 1.017 1.005 - 1.030    Urine Hgb TRACE (A) NEGATIVE    pH, UA 8.0 5.0 - 8.0    Protein, UA TRACE (A) NEGATIVE    Urobilinogen, Urine Normal Normal    Nitrite, Urine NEGATIVE NEGATIVE    Leukocyte Esterase, Urine LARGE (A) NEGATIVE    Urinalysis Comments NOT REPORTED    Lactic Acid, Plasma   Result Value Ref Range    Lactic Acid NOT REPORTED mmol/L    Lactic Acid, Whole Blood 1.3 0.7 - 2.1 mmol/L   Microscopic Urinalysis   Result Value Ref Range    -          WBC, UA 50  0 - 5 /HPF    RBC, UA 2 TO 5 0 - 2 /HPF    Casts UA NOT REPORTED 0 - 2 /LPF    Crystals, UA NOT REPORTED None /HPF    Epithelial Cells UA TOO NUMEROUS TO COUNT 0 - 5 /HPF    Renal Epithelial, UA NOT REPORTED 0 /HPF    Bacteria, UA MANY (A) None    Mucus, UA NOT REPORTED None    Trichomonas, UA NOT REPORTED None    Amorphous, UA NOT REPORTED None    Other Observations UA NOT REPORTED NOT REQ. Yeast, UA NOT REPORTED None   EKG 12 Lead   Result Value Ref Range    Ventricular Rate 102 BPM    Atrial Rate 102 BPM    P-R Interval 120 ms    QRS Duration 82 ms    Q-T Interval 346 ms    QTc Calculation (Bazett) 450 ms    P Axis 77 degrees    R Axis 68 degrees    T Axis 81 degrees         RADIOLOGY:  XR CHEST (2 VW)    Result Date: 8/21/2021  EXAMINATION: TWO XRAY VIEWS OF THE CHEST 8/21/2021 3:54 am COMPARISON: 12/15/2016 HISTORY: ORDERING SYSTEM PROVIDED HISTORY: atypical chest pain TECHNOLOGIST PROVIDED HISTORY: atypical chest pain FINDINGS: Heart size and pulmonary vasculature are normal.  The lungs are clear and normally expanded. Surrounding osseous and soft tissue structures are unremarkable. Normal examination. XR ABDOMEN (KUB) (SINGLE AP VIEW)    Result Date: 8/21/2021  EXAMINATION: ONE SUPINE XRAY VIEW(S) OF THE ABDOMEN 8/21/2021 3:54 am COMPARISON: None. HISTORY: ORDERING SYSTEM PROVIDED HISTORY: constipation TECHNOLOGIST PROVIDED HISTORY: constipation FINDINGS: The bowel gas pattern is within normal limits. No free air or large mass is identified. The visualized lung bases are clear.   Surrounding osseous and soft tissue structures are unremarkable. No acute or concerning abnormality evident. CT ABDOMEN PELVIS W IV CONTRAST Additional Contrast? None    Result Date: 8/21/2021  EXAMINATION: CT OF THE ABDOMEN AND PELVIS WITH CONTRAST 8/21/2021 4:25 am TECHNIQUE: CT of the abdomen and pelvis was performed with the administration of intravenous contrast. Multiplanar reformatted images are provided for review. Dose modulation, iterative reconstruction, and/or weight based adjustment of the mA/kV was utilized to reduce the radiation dose to as low as reasonably achievable. COMPARISON: None. HISTORY: ORDERING SYSTEM PROVIDED HISTORY: [facility] pain TECHNOLOGIST PROVIDED HISTORY: [facility] pain Decision Support Exception - unselect if not a suspected or confirmed emergency medical condition->Emergency Medical Condition (MA) Reason for Exam: abd pain FINDINGS: Lower Chest:  Visualized portion of the lower chest demonstrates no acute abnormality. Organs: The liver is unremarkable. The gallbladder contain several mildly calcified centrally lucent gallstones. The largest and most calcified is lodged at the neck of the gallbladder and measures 14 x 15 x 24 mm. There is mild gallbladder wall thickening. The pancreas, spleen, adrenal glands, bilateral ureters are normal.  Tiny bilateral renal hypodensities are present consistent with cysts but too small to accurately characterize. No follow-up recommended. GI/Bowel: Stomach and duodenal sweep demonstrate no acute abnormality. There is no evidence of bowel obstruction. No evidence of abnormal bowel wall thickening or distension. The appendix is visualized and is unremarkable. No evidence of acute appendicitis. Pelvis: The bladder is nearly empty and not well evaluated. No gross abnormality is evident. The uterus is enlarged, lobulated and partially calcified consistent with multiple degenerative fibroids. Peritoneum/Retroperitoneum: No evidence of ascites or free air.   No evidence of lymphadenopathy. Aorta is normal in caliber. Bones/Soft Tissues: Unremarkable. Cholelithiasis with suggestion of cholecystitis. Fibroid uterus. EKG  EKG Interpretation    Interpreted by me    Rhythm: normal sinus   Rate: normal  Axis: normal  Ectopy: none  Conduction: normal  ST Segments: no acute change  T Waves: no acute change  Q Waves: none    Clinical Impression: no acute changes and normal EKG    All EKG's are interpreted by the Emergency Department Physician who either signs or Co-signs this chart in the absence of a cardiologist.    EMERGENCY DEPARTMENT COURSE:  Patient breathing quietly and unlabored on room air. Speech is normal and speaking in full sentences without requiring to pause to take a breath. Patient's pain is vague and diffuse but possibly worse in the right upper quadrant. Concern for constipation given not having a bowel movement in approximately 5 days. Also concern for atypical MI ACS pain, will do cardiac work-up, KUB, abdomen labs. KUB nonspecific, possible stool burden. Also white count elevated over 16. Will get CT abdomen with contrast.  Lactic negative    CT shows gallstones in the neck, cholecystitis. Patient   Meets SIRS criteria, identified at 430, not ordering coagulant profile due to national shortage of tubes. .  Will start fluid bolus. Lactate negative, giving Rocephin. Consulted general surgery. Signed out to Dr Carson Arias:  None    CONSULTS:  IP CONSULT TO GENERAL SURGERY    CRITICAL CARE:  None    FINAL IMPRESSION      1. Cholecystitis          DISPOSITION / PLAN     DISPOSITION Decision To Admit 08/21/2021 06:43:27 AM      PATIENT REFERRED TO:  No follow-up provider specified.     DISCHARGE MEDICATIONS:  New Prescriptions    No medications on file       Gloria De MD  Emergency Medicine Resident    (Please note that portions of thisnote were completed with a voice recognition program.  Efforts were made to edit the dictations but occasionally words are mis-transcribed.)       Toma Palacio MD  Resident  08/21/21 [id number]       Toma Palacio MD  Resident  08/21/21 0911

## 2021-08-21 NOTE — ED NOTES
Surgery was in to reassess patient and informed her they were doing surgery tomorrow  Patient understands she is on clear liquid diet and then NPO at midnight  Pain is 0-1 of 10 at this time     Curtis Aviles RN  08/21/21 [id number]

## 2021-08-21 NOTE — CARE COORDINATION
Case Management Initial Discharge Plan  Noris Scott             Met with:patient to discuss discharge plans. Information verified: address, contacts, phone number, , insurance Yes  Insurance Provider: [facility] Medicare, Medicaid    Emergency Contact/Next of Kin name & number: Lucho Howell  Who are involved in patient's support system? son    PCP: Ama Alcazar MD  Date of last visit:       Discharge Planning    Living Arrangements:    Lives alone    Home has 2 stories  3 stairs to climb to get into front door, flight of stairs to climb to reach second floor  Location of bedroom/bathroom in home upper    Patient able to perform ADL's:Independent    Current Services (outpatient & in home) DME  DME equipment: has shower chair  DME provider:     Is patient receiving oral anticoagulation therapy? No    If indicated:   Physician managing anticoagulation treatment: na  Where does patient obtain lab work for ATC treatment? na      Potential Assistance Needed:       Patient agreeable to home care: TBD  [place] of choice provided:  will need choice if appropriate needs    Prior SNF/Rehab Placement and Facility: none  Agreeable to SNF/Rehab: No  [place] of choice provided: n/a     Evaluation: n/a    Expected Discharge date:       Patient expects to be discharged to: If home: is the family and/or caregiver wiling & able to provide support at home? yes  Who will be providing this support? Lucho Newman    Follow Up Appointment: Best Day/ Time:      Transportation provider: lucho Newman  Transportation arrangements needed for discharge: No    Readmission Risk              Risk of Unplanned Readmission:  0             Does patient have a readmission risk score greater than 14?: No  If yes, follow-up appointment must be made within 7 days of discharge.      Goals of Care: pain control      Educated pt on transitional options, provided freedom of choice and are agreeable with plan      Discharge Plan: home independent, if surgery monitor for [facility]. needs, has transportation          Electronically signed by Mateo Buchanan RN on 8/21/21 at 11:26 AM EDT

## 2021-08-21 NOTE — ED NOTES
Report received from Peri Lema [facility]. All questions answered.       Jay Tony RN  08/21/21 [id number]

## 2021-08-21 NOTE — CONSULTS
Consult - General Surgery    PATIENT NAME: Prabhu Birmingham  AGE: 79 y.o. MEDICAL RECORD NO. [id number]  DATE: 8/21/2021  SURGEON: Dr. Jan Simons: Shelia Bowers MD    Patient evaluated at the request of  Dr. Javad Rizo   Reason for evaluation: Concern for cholecystitis     Patient information was obtained from patient. History/Exam limitations: none. IMPRESSION:     Patient Active Problem List   Diagnosis    Macular hole of both eyes   3 70-year-old female with cholelithiasis, possible cholecystitis. MEDICAL DECISION MAKING AND PLAN:   1. Patient seen and examined, labs and imaging reviewed. Patient with a leukocytosis, however she also has a urinary tract infection. Will obtain right upper quadrant ultrasound to further evaluate the gallbladder for cholecystitis. Further recommendations pending ultrasound results. HISTORY:   History of Chief Complaint:    Prabhu Birmingham is a 79 y.o. female who presents with a 5-day history of abdominal pain. Patient states that over the past 5 days he has been experiencing abdominal pain intermittently. Pain is located in the upper abdomen and sometimes radiates to her back. She is unsure if this is related to eating. Patient states that she has been taking Pepto-Bismol at home without any relief. She denies any previous similar symptoms or known gallbladder issues. Patient states that she did experience some nausea today, no emesis. She reports that she has been having normal, nonbloody bowel movements and is passing flatus. Patient underwent CT imaging that demonstrated cholelithiasis and mild gallbladder wall thickening. WBC noted to be 16.6, noted to also have a UTI. Past Medical History   has a past medical history of Blurred vision, right eye, Breast cancer ([address] Hypertension, Macular hole, right eye, Wears dentures, and Wears glasses.   Past Surgical History   has a past surgical history that includes Tubal ligation (1990); Tonsillectomy and adenoidectomy; skin biopsy (2000); Breast biopsy; Colonoscopy (02/2009); Breast lumpectomy (Left, 2013); Cataract removal with implant (Bilateral, 07/2020); vitrectomy (Right, 12/17/2020); vitrectomy (Right, 12/17/2020); Colonoscopy (N/A, 4/15/2021); and Upper gastrointestinal endoscopy (N/A, 4/15/2021). Medications  Prior to Admission medications    Medication Sig Start Date End Date Taking? Authorizing Provider   hydrochlorothiazide (HYDRODIURIL) 12.5 MG tablet Take 12.5 mg by mouth daily    Historical Provider, MD   potassium chloride (MICRO-K) 10 MEQ extended release capsule Take 10 mEq by mouth daily    Historical Provider, MD   calcium 600 MG TABS tablet Take 1 tablet by mouth daily    Historical Provider, MD    Scheduled Meds:   polyethylene glycol  17 g Oral Daily     Continuous Infusions:  PRN Meds:.magnesium hydroxide, bisacodyl  Allergies  has No Known Allergies. Family History  family history includes Breast Cancer in her mother; Cancer in her sister; Diabetes in her mother; Hypertension in her mother and sister. Social History   reports that she has been smoking. She has a 40.00 pack-year smoking history. She has never used smokeless tobacco.   reports no history of alcohol use. reports no history of drug use. Review of Systems  General Denies any fever or chills  HEENT Denies any diplopia, tinnitus or vertigo  Resp Denies any shortness of breath, cough or wheezing  Cardiac Denies any chest pain, palpitations, claudication or edema  GI Positive for abdominal pain, nausea    Denies any frequency, urgency, hesitancy or incontinence  Heme Denies bruising or bleeding easily  Endocrine Denies any history of diabetes or thyroid disease  Neuro Denies any focal motor or sensory deficits    PHYSICAL:   VITALS:  height is 5' 4\" (1.626 m) and weight is 160 lb (72.6 kg). Her temperature is 99.2 °F (37.3 °C). Her blood pressure is 127/81 and her pulse is 99.  Her respiration is 16 and oxygen saturation is 98%. CONSTITUTIONAL: Alert and oriented times 3, no acute distress and cooperative to examination. HEENT: NCAT  NECK: Soft, trachea midline and straight  LUNGS: No acute respiratory distress or accessory muscle use   CARDIOVASCULAR: Regular rate   ABDOMEN: Soft, non-distended, mildly tender to palpation RUQ.  No rebound, guarding, or rigidity   NEUROLOGIC: There are no focalizing motor or sensory deficits  EXTREMITIES: no cyanosis, clubbing or edema    LABS:     Recent Labs     08/21/21  0330 08/21/21  0358   WBC 16.6*  --    HGB 15.6*  --    HCT 44.3  --      --      --    K 4.0  --      --    CO2 24  --    BUN 8  --    CREATININE 0.65  --    CALCIUM 9.6  --    AST 14  --    ALT 14  --    BILITOT 0.44  --    NITRU  --  NEGATIVE   COLORU  --  YELLOW   BACTERIA  --  MANY*     Recent Labs     08/21/21  0330   ALKPHOS 121*   ALT 14   AST 14   BILITOT 0.44   LABALBU 4.0   LIPASE 27     CBC with Differential:    Lab Results   Component Value Date    WBC 16.6 08/21/2021    RBC 5.30 08/21/2021    RBC 4.62 03/22/2012    HGB 15.6 08/21/2021    HCT 44.3 08/21/2021     08/21/2021     03/22/2012    MCV 83.6 08/21/2021    MCH 29.4 08/21/2021    MCHC 35.2 08/21/2021    RDW 13.2 08/21/2021    LYMPHOPCT 15 08/21/2021    MONOPCT 8 08/21/2021    BASOPCT 0 08/21/2021    MONOSABS 1.28 08/21/2021    LYMPHSABS 2.47 08/21/2021    EOSABS 0.15 08/21/2021    BASOSABS 0.07 08/21/2021    DIFFTYPE NOT REPORTED 08/21/2021     CMP:    Lab Results   Component Value Date     08/21/2021    K 4.0 08/21/2021     08/21/2021    CO2 24 08/21/2021    BUN 8 08/21/2021    CREATININE 0.65 08/21/2021    GFRAA >60 08/21/2021    LABGLOM >60 08/21/2021    GLUCOSE 101 08/21/2021    GLUCOSE 115 03/22/2012    PROT 8.7 08/21/2021    LABALBU 4.0 08/21/2021    CALCIUM 9.6 08/21/2021    BILITOT 0.44 08/21/2021    ALKPHOS 121 08/21/2021    AST 14 08/21/2021    ALT 14 08/21/2021     Troponin:    Lab Results   Component Value Date    TROPONINI 0.00 12/15/2016     LIPASE:    Lab Results   Component Value Date    LIPASE 27 08/21/2021     Lactic Acid: 1.3      RADIOLOGY:   XR CHEST (2 VW)    Result Date: 8/21/2021  EXAMINATION: TWO XRAY VIEWS OF THE CHEST 8/21/2021 3:54 am COMPARISON: 12/15/2016 HISTORY: ORDERING SYSTEM PROVIDED HISTORY: atypical chest pain TECHNOLOGIST PROVIDED HISTORY: atypical chest pain FINDINGS: Heart size and pulmonary vasculature are normal.  The lungs are clear and normally expanded. Surrounding osseous and soft tissue structures are unremarkable. Normal examination. XR ABDOMEN (KUB) (SINGLE AP VIEW)    Result Date: 8/21/2021  EXAMINATION: ONE SUPINE XRAY VIEW(S) OF THE ABDOMEN 8/21/2021 3:54 am COMPARISON: None. HISTORY: ORDERING SYSTEM PROVIDED HISTORY: constipation TECHNOLOGIST PROVIDED HISTORY: constipation FINDINGS: The bowel gas pattern is within normal limits. No free air or large mass is identified. The visualized lung bases are clear. Surrounding osseous and soft tissue structures are unremarkable. No acute or concerning abnormality evident. CT ABDOMEN PELVIS W IV CONTRAST Additional Contrast? None    Result Date: 8/21/2021  EXAMINATION: CT OF THE ABDOMEN AND PELVIS WITH CONTRAST 8/21/2021 4:25 am TECHNIQUE: CT of the abdomen and pelvis was performed with the administration of intravenous contrast. Multiplanar reformatted images are provided for review. Dose modulation, iterative reconstruction, and/or weight based adjustment of the mA/kV was utilized to reduce the radiation dose to as low as reasonably achievable. COMPARISON: None.  HISTORY: ORDERING SYSTEM PROVIDED HISTORY: abd pain TECHNOLOGIST PROVIDED HISTORY: abd pain Decision Support Exception - unselect if not a suspected or confirmed emergency medical condition->Emergency Medical Condition (MA) Reason for Exam: abd pain FINDINGS: Lower Chest:  Visualized portion of the lower chest demonstrates no acute abnormality. Organs: The liver is unremarkable. The gallbladder contain several mildly calcified centrally lucent gallstones. The largest and most calcified is lodged at the neck of the gallbladder and measures 14 x 15 x 24 mm. There is mild gallbladder wall thickening. The pancreas, spleen, adrenal glands, bilateral ureters are normal.  Tiny bilateral renal hypodensities are present consistent with cysts but too small to accurately characterize. No follow-up recommended. GI/Bowel: Stomach and duodenal sweep demonstrate no acute abnormality. There is no evidence of bowel obstruction. No evidence of abnormal bowel wall thickening or distension. The appendix is visualized and is unremarkable. No evidence of acute appendicitis. Pelvis: The bladder is nearly empty and not well evaluated. No gross abnormality is evident. The uterus is enlarged, lobulated and partially calcified consistent with multiple degenerative fibroids. Peritoneum/Retroperitoneum: No evidence of ascites or free air. No evidence of lymphadenopathy. Aorta is normal in caliber. Bones/Soft Tissues: Unremarkable. Cholelithiasis with suggestion of cholecystitis. Fibroid uterus. Thank you for the interesting evaluation. Further recommendations to follow.     Jonathan Zheng DO  8/21/21, 5:15 AM

## 2021-08-21 NOTE — ED PROVIDER NOTES
[facility] ED  Emergency Department  Emergency Medicine Resident Sign-out     Care of Maria Esther Parks was assumed from Dr. Junaid Red and is being seen for Abdominal Pain   . The patient's initial evaluation and plan have been discussed with the prior provider who initially evaluated the patient.      EMERGENCY DEPARTMENT COURSE / MEDICAL DECISION MAKING:       MEDICATIONS GIVEN:  Orders Placed This Encounter   Medications    magnesium hydroxide (MILK OF MAGNESIA) 400 MG/5ML suspension 30 mL    bisacodyl (DULCOLAX) suppository 10 mg    polyethylene glycol (GLYCOLAX) packet 17 g    morphine injection 2 mg    iopamidol (ISOVUE-370) 76 % injection 75 mL    cefTRIAXone (ROCEPHIN) 1000 mg IVPB in 50 mL D5W minibag     Order Specific Question:   Antimicrobial Indications     Answer:   Urinary Tract Infection    DISCONTD: 0.9 % sodium chloride bolus    0.9 % sodium chloride bolus       LABS / RADIOLOGY:     Labs Reviewed   CBC WITH AUTO DIFFERENTIAL - Abnormal; Notable for the following components:       Result Value    WBC 16.6 (*)     RBC 5.30 (*)     Hemoglobin 15.6 (*)     MCHC 35.2 (*)     Seg Neutrophils 76 (*)     Lymphocytes 15 (*)     Segs Absolute 12.56 (*)     Absolute Mono # 1.28 (*)     All other components within normal limits   COMPREHENSIVE METABOLIC PANEL W/ REFLEX TO MG FOR LOW K - Abnormal; Notable for the following components:    Glucose 101 (*)     Alkaline Phosphatase 121 (*)     Total Protein 8.7 (*)     Albumin/Globulin Ratio 0.9 (*)     All other components within normal limits   URINE RT REFLEX TO CULTURE - Abnormal; Notable for the following components:    Turbidity UA TURBID (*)     Urine Hgb TRACE (*)     Protein, UA TRACE (*)     Leukocyte Esterase, Urine LARGE (*)     All other components within normal limits   MICROSCOPIC URINALYSIS - Abnormal; Notable for the following components:    Bacteria, UA MANY (*)     All other components within normal limits   CULTURE, URINE   CULTURE, BLOOD 1   CULTURE, BLOOD 1   LIPASE   BRAIN NATRIURETIC PEPTIDE   TROPONIN   LACTIC ACID, PLASMA       CT ABDOMEN PELVIS W IV CONTRAST Additional Contrast? None   Final Result   Cholelithiasis with suggestion of cholecystitis. Fibroid uterus. XR CHEST (2 VW)   Final Result   Normal examination. XR ABDOMEN (KUB) (SINGLE AP VIEW)   Final Result   No acute or concerning abnormality evident. US GALLBLADDER RUQ    (Results Pending)       RECENT VITALS:     Temp: 99.2 °F (37.3 °C),  Pulse: 99, Resp: 16, BP: (!) 157/82, SpO2: 95 %    This patient is a 79 y.o. Female with 5 days of right upper quadrant abdominal pain negative, patient has evidence of cholecystitis patient does also have UTI without any symptoms, patient is given Rocephin for cholecystitis, found to have impacted stone, patient did meet SIRS criteria, did have sepsis work-up. General surgery consulted, awaiting recommendations from general surgery versus admission to their service versus medicine. General surgery evaluated patient, right upper quadrant ultrasound was obtained which did show concerning signs for acute cholecystitis, surgery requested Flagyl be given, patient mated to their service. OUTSTANDING TASKS / RECOMMENDATIONS:      1. Await general surgery recommendation     FINAL IMPRESSION:     1. Cholecystitis        DISPOSITION:       DISPOSITION:  []  Discharge   []  Transfer -    [x]  Admission -surgery     []  Against Medical Advice   []  Eloped   FOLLOW-UP: No follow-up provider specified.    DISCHARGE MEDICATIONS: New Prescriptions    No medications on file          Selina Akins DO  Emergency Medicine Resident  [address] [place], [place]  Resident  08/21/21 4502

## 2021-08-21 NOTE — ED PROVIDER NOTES
[address] HANDOFF       Handoff taken on the following patient from prior Attending Physician:  Pt Name: Tiffanie Arevalofinkel  PCP:  Edilson Villarreal MD    Attestation  I was available and discussed any additional care issues that arose and coordinated the management plans with the resident(s) caring for the patient during my duty period. Any areas of disagreement with resident's documentation of care or procedures are noted on the chart. I was personally present for the key portions of any/all procedures during my duty period. I have documented in the chart those procedures where I was not present during the key portions.              June Giles MD  08/21/21 0627

## 2021-08-21 NOTE — ED NOTES
Patient presents to ED with c/o abd pain for 5 days, intermittent, currently 5/10, described as needles. Patient states she has taken multiple OTC remedies without relief, states pain is keeping her up at night. Patient alert and oriented x 3, resp e/u, skin PWD, NADN.      Alicia Campuzano RN  08/21/21 5615

## 2021-08-21 NOTE — ED PROVIDER NOTES
[address] [facility]   Emergency Department  Faculty Attestation       I performed a history and physical examination of the patient and discussed management with the resident. I reviewed the residents note and agree with the documented findings including all diagnostic interpretations and plan of care. Any areas of disagreement are noted on the chart. I was personally present for the key portions of any procedures. I have documented in the chart those procedures where I was not present during the key portions. I have reviewed the emergency nurses triage note. I agree with the chief complaint, past medical history, past surgical history, allergies, medications, social and family history as documented unless otherwise noted below. Documentation of the HPI, Physical Exam and Medical Decision Making performed by scribes is based on my personal performance of the HPI, PE and MDM. For Physician Assistant/ Nurse Practitioner cases/documentation I have personally evaluated this patient and have completed at least one if not all key elements of the E/M (history, physical exam, and MDM). Additional findings are as noted. Pertinent Comments     Primary Care Physician: Chhaya Slade MD      ED Triage Vitals   BP Temp Temp src Pulse Resp SpO2 Height Weight   08/21/21 0347 08/21/21 0325 -- 08/21/21 0325 08/21/21 0325 08/21/21 0325 08/21/21 0325 08/21/21 0325   127/81 99.2 °F (37.3 °C)  99 16 97 % 5' 4\" (1.626 m) 160 lb (72.6 kg)        History/Physical: This is a 79 y.o. female who presents to the Emergency Department with complaint of abdominal pain with some intermittent abdominal pain that is dull and located in the RUQ. Had a BM today, but was small. No chest pain or shortness of breath. On exam, patient is well-appearing in no acute distress. Normocephalic atraumatic. Moist mucous membranes. Heart sounds regular lungs clear to auscultation.   Abdomen is soft but has tenderness in the right upper quadrant however no significant Acuna sign no rebound or guarding. She is alert and oriented x4. Priyank Ra No focal deficits. No jaundice or rash on exposed skin    MDM/Plan:   Abdominal pain, localized to right upper quadrant, concern for possible gout gallbladder pathology, however given that she is also had minimal bowel movements concern for other etiologies such as small bowel obstruction, ileus, diverticulitis. Start with labs, and imaging. Patient's UA shows UTI at 4:30 AM.  Patient now meets sepsis criteria at this time due to elevated heart rate above 90 as well as white count above 16. Will give Rocephin. PT/PTT/INR not ordered due to the national shortage of blue top for lab collection. CT scan showed cholelithiasis with concern for possible cholecystitis.   Ged surg consult and admit    Critical Care: None     Dipak Nicole MD  Attending Emergency Physician         Dipak Nicole MD  08/23/21 0226

## 2021-08-21 NOTE — ED NOTES
The following labs labeled with pt sticker and tubed: by me    [] Romana Robins   [] on ice   [] Blue   [] Green/yellow  [] Green/black [] on ice  [] Pink  [] Red  [] Yellow       [x] COVID-19 swab    [x] Rapid     [] Urine Sample  [] Pelvic Cultures    [] Blood Cultures            Ronal Jacobs RN  08/21/21 7180

## 2021-08-22 ENCOUNTER — ANESTHESIA (OUTPATIENT)
Dept: OPERATING ROOM | Age: 71
DRG: 418 | End: 2021-08-22
Payer: MEDICARE

## 2021-08-22 ENCOUNTER — ANESTHESIA EVENT (OUTPATIENT)
Dept: OPERATING ROOM | Age: 71
DRG: 418 | End: 2021-08-22
Payer: MEDICARE

## 2021-08-22 VITALS — TEMPERATURE: 97.7 F | SYSTOLIC BLOOD PRESSURE: 147 MMHG | OXYGEN SATURATION: 98 % | DIASTOLIC BLOOD PRESSURE: 70 MMHG

## 2021-08-22 LAB
ABSOLUTE EOS #: 0.12 K/UL (ref 0–0.44)
ABSOLUTE IMMATURE GRANULOCYTE: 0.04 K/UL (ref 0–0.3)
ABSOLUTE LYMPH #: 1.69 K/UL (ref 1.1–3.7)
ABSOLUTE MONO #: 1.27 K/UL (ref 0.1–1.2)
ALBUMIN SERPL-MCNC: 2.9 G/DL (ref 3.5–5.2)
ALBUMIN/GLOBULIN RATIO: 0.8 (ref 1–2.5)
ALP BLD-CCNC: 92 U/L (ref 35–104)
ALT SERPL-CCNC: 12 U/L (ref 5–33)
ANION GAP SERPL CALCULATED.3IONS-SCNC: 9 MMOL/L (ref 9–17)
AST SERPL-CCNC: 16 U/L
BASOPHILS # BLD: 0 % (ref 0–2)
BASOPHILS ABSOLUTE: 0.04 K/UL (ref 0–0.2)
BILIRUB SERPL-MCNC: 1.12 MG/DL (ref 0.3–1.2)
BILIRUBIN DIRECT: 0.17 MG/DL
BILIRUBIN, INDIRECT: 0.95 MG/DL (ref 0–1)
BUN BLDV-MCNC: 7 MG/DL (ref 8–23)
BUN/CREAT BLD: ABNORMAL (ref 9–20)
CALCIUM SERPL-MCNC: 8.6 MG/DL (ref 8.6–10.4)
CHLORIDE BLD-SCNC: 103 MMOL/L (ref 98–107)
CO2: 21 MMOL/L (ref 20–31)
CREAT SERPL-MCNC: 0.54 MG/DL (ref 0.5–0.9)
CULTURE: NORMAL
DIFFERENTIAL TYPE: ABNORMAL
EOSINOPHILS RELATIVE PERCENT: 1 % (ref 1–4)
GFR AFRICAN AMERICAN: >60 ML/MIN
GFR NON-AFRICAN AMERICAN: >60 ML/MIN
GFR SERPL CREATININE-BSD FRML MDRD: ABNORMAL ML/MIN/{1.73_M2}
GFR SERPL CREATININE-BSD FRML MDRD: ABNORMAL ML/MIN/{1.73_M2}
GLOBULIN: ABNORMAL G/DL (ref 1.5–3.8)
GLUCOSE BLD-MCNC: 92 MG/DL (ref 70–99)
HCT VFR BLD CALC: 36.1 % (ref 36.3–47.1)
HEMOGLOBIN: 12.1 G/DL (ref 11.9–15.1)
IMMATURE GRANULOCYTES: 0 %
LYMPHOCYTES # BLD: 13 % (ref 24–43)
Lab: NORMAL
MCH RBC QN AUTO: 29.8 PG (ref 25.2–33.5)
MCHC RBC AUTO-ENTMCNC: 33.5 G/DL (ref 28.4–34.8)
MCV RBC AUTO: 88.9 FL (ref 82.6–102.9)
MONOCYTES # BLD: 10 % (ref 3–12)
NRBC AUTOMATED: 0 PER 100 WBC
PDW BLD-RTO: 13.5 % (ref 11.8–14.4)
PLATELET # BLD: 197 K/UL (ref 138–453)
PLATELET ESTIMATE: ABNORMAL
PMV BLD AUTO: 10.7 FL (ref 8.1–13.5)
POTASSIUM SERPL-SCNC: 3.9 MMOL/L (ref 3.7–5.3)
RBC # BLD: 4.06 M/UL (ref 3.95–5.11)
RBC # BLD: ABNORMAL 10*6/UL
SEG NEUTROPHILS: 75 % (ref 36–65)
SEGMENTED NEUTROPHILS ABSOLUTE COUNT: 9.43 K/UL (ref 1.5–8.1)
SODIUM BLD-SCNC: 133 MMOL/L (ref 135–144)
SPECIMEN DESCRIPTION: NORMAL
TOTAL PROTEIN: 6.5 G/DL (ref 6.4–8.3)
WBC # BLD: 12.6 K/UL (ref 3.5–11.3)
WBC # BLD: ABNORMAL 10*3/UL

## 2021-08-22 PROCEDURE — 2580000003 HC RX 258: Performed by: NURSE ANESTHETIST, CERTIFIED REGISTERED

## 2021-08-22 PROCEDURE — 2500000003 HC RX 250 WO HCPCS: Performed by: NURSE ANESTHETIST, CERTIFIED REGISTERED

## 2021-08-22 PROCEDURE — 6360000002 HC RX W HCPCS: Performed by: NURSE ANESTHETIST, CERTIFIED REGISTERED

## 2021-08-22 PROCEDURE — 7100000001 HC PACU RECOVERY - ADDTL 15 MIN: Performed by: SURGERY

## 2021-08-22 PROCEDURE — 85025 COMPLETE CBC W/AUTO DIFF WBC: CPT

## 2021-08-22 PROCEDURE — 2709999900 HC NON-CHARGEABLE SUPPLY: Performed by: SURGERY

## 2021-08-22 PROCEDURE — 2580000003 HC RX 258: Performed by: STUDENT IN AN ORGANIZED HEALTH CARE EDUCATION/TRAINING PROGRAM

## 2021-08-22 PROCEDURE — 2580000003 HC RX 258: Performed by: SURGERY

## 2021-08-22 PROCEDURE — 1200000000 HC SEMI PRIVATE

## 2021-08-22 PROCEDURE — 6360000002 HC RX W HCPCS: Performed by: STUDENT IN AN ORGANIZED HEALTH CARE EDUCATION/TRAINING PROGRAM

## 2021-08-22 PROCEDURE — G0378 HOSPITAL OBSERVATION PER HR: HCPCS

## 2021-08-22 PROCEDURE — 3600000014 HC SURGERY LEVEL 4 ADDTL 15MIN: Performed by: SURGERY

## 2021-08-22 PROCEDURE — 7100000000 HC PACU RECOVERY - FIRST 15 MIN: Performed by: SURGERY

## 2021-08-22 PROCEDURE — 3700000000 HC ANESTHESIA ATTENDED CARE: Performed by: SURGERY

## 2021-08-22 PROCEDURE — 3600000004 HC SURGERY LEVEL 4 BASE: Performed by: SURGERY

## 2021-08-22 PROCEDURE — 6370000000 HC RX 637 (ALT 250 FOR IP): Performed by: STUDENT IN AN ORGANIZED HEALTH CARE EDUCATION/TRAINING PROGRAM

## 2021-08-22 PROCEDURE — 0FT44ZZ RESECTION OF GALLBLADDER, PERCUTANEOUS ENDOSCOPIC APPROACH: ICD-10-PCS | Performed by: SURGERY

## 2021-08-22 PROCEDURE — 6370000000 HC RX 637 (ALT 250 FOR IP): Performed by: NURSE ANESTHETIST, CERTIFIED REGISTERED

## 2021-08-22 PROCEDURE — 80076 HEPATIC FUNCTION PANEL: CPT

## 2021-08-22 PROCEDURE — 3700000001 HC ADD 15 MINUTES (ANESTHESIA): Performed by: SURGERY

## 2021-08-22 PROCEDURE — 2500000003 HC RX 250 WO HCPCS: Performed by: SURGERY

## 2021-08-22 PROCEDURE — 80048 BASIC METABOLIC PNL TOTAL CA: CPT

## 2021-08-22 RX ORDER — ONDANSETRON 2 MG/ML
4 INJECTION INTRAMUSCULAR; INTRAVENOUS
Status: DISCONTINUED | OUTPATIENT
Start: 2021-08-22 | End: 2021-08-22 | Stop reason: HOSPADM

## 2021-08-22 RX ORDER — LIDOCAINE HYDROCHLORIDE 10 MG/ML
INJECTION, SOLUTION EPIDURAL; INFILTRATION; INTRACAUDAL; PERINEURAL PRN
Status: DISCONTINUED | OUTPATIENT
Start: 2021-08-22 | End: 2021-08-22 | Stop reason: SDUPTHER

## 2021-08-22 RX ORDER — FENTANYL CITRATE 50 UG/ML
25 INJECTION, SOLUTION INTRAMUSCULAR; INTRAVENOUS EVERY 5 MIN PRN
Status: DISCONTINUED | OUTPATIENT
Start: 2021-08-22 | End: 2021-08-22 | Stop reason: HOSPADM

## 2021-08-22 RX ORDER — METOPROLOL TARTRATE 5 MG/5ML
INJECTION INTRAVENOUS PRN
Status: DISCONTINUED | OUTPATIENT
Start: 2021-08-22 | End: 2021-08-22 | Stop reason: SDUPTHER

## 2021-08-22 RX ORDER — MORPHINE SULFATE 2 MG/ML
2 INJECTION, SOLUTION INTRAMUSCULAR; INTRAVENOUS EVERY 5 MIN PRN
Status: DISCONTINUED | OUTPATIENT
Start: 2021-08-22 | End: 2021-08-22 | Stop reason: HOSPADM

## 2021-08-22 RX ORDER — ROCURONIUM BROMIDE 10 MG/ML
INJECTION, SOLUTION INTRAVENOUS PRN
Status: DISCONTINUED | OUTPATIENT
Start: 2021-08-22 | End: 2021-08-22 | Stop reason: SDUPTHER

## 2021-08-22 RX ORDER — SODIUM CHLORIDE 9 MG/ML
INJECTION, SOLUTION INTRAVENOUS CONTINUOUS PRN
Status: DISCONTINUED | OUTPATIENT
Start: 2021-08-22 | End: 2021-08-22 | Stop reason: SDUPTHER

## 2021-08-22 RX ORDER — LABETALOL HYDROCHLORIDE 5 MG/ML
5 INJECTION, SOLUTION INTRAVENOUS EVERY 10 MIN PRN
Status: DISCONTINUED | OUTPATIENT
Start: 2021-08-22 | End: 2021-08-22 | Stop reason: HOSPADM

## 2021-08-22 RX ORDER — DIPHENHYDRAMINE HYDROCHLORIDE 50 MG/ML
12.5 INJECTION INTRAMUSCULAR; INTRAVENOUS
Status: DISCONTINUED | OUTPATIENT
Start: 2021-08-22 | End: 2021-08-22 | Stop reason: HOSPADM

## 2021-08-22 RX ORDER — ALBUTEROL SULFATE 90 UG/1
AEROSOL, METERED RESPIRATORY (INHALATION) PRN
Status: DISCONTINUED | OUTPATIENT
Start: 2021-08-22 | End: 2021-08-22 | Stop reason: SDUPTHER

## 2021-08-22 RX ORDER — FENTANYL CITRATE 50 UG/ML
INJECTION, SOLUTION INTRAMUSCULAR; INTRAVENOUS PRN
Status: DISCONTINUED | OUTPATIENT
Start: 2021-08-22 | End: 2021-08-22 | Stop reason: SDUPTHER

## 2021-08-22 RX ORDER — OXYCODONE HYDROCHLORIDE AND ACETAMINOPHEN 5; 325 MG/1; MG/1
2 TABLET ORAL PRN
Status: DISCONTINUED | OUTPATIENT
Start: 2021-08-22 | End: 2021-08-22 | Stop reason: HOSPADM

## 2021-08-22 RX ORDER — BUPIVACAINE HYDROCHLORIDE 2.5 MG/ML
INJECTION, SOLUTION INFILTRATION; PERINEURAL PRN
Status: DISCONTINUED | OUTPATIENT
Start: 2021-08-22 | End: 2021-08-22 | Stop reason: ALTCHOICE

## 2021-08-22 RX ORDER — OXYCODONE HYDROCHLORIDE AND ACETAMINOPHEN 5; 325 MG/1; MG/1
1 TABLET ORAL PRN
Status: DISCONTINUED | OUTPATIENT
Start: 2021-08-22 | End: 2021-08-22 | Stop reason: HOSPADM

## 2021-08-22 RX ORDER — MAGNESIUM HYDROXIDE 1200 MG/15ML
LIQUID ORAL CONTINUOUS PRN
Status: COMPLETED | OUTPATIENT
Start: 2021-08-22 | End: 2021-08-22

## 2021-08-22 RX ORDER — PROPOFOL 10 MG/ML
INJECTION, EMULSION INTRAVENOUS PRN
Status: DISCONTINUED | OUTPATIENT
Start: 2021-08-22 | End: 2021-08-22 | Stop reason: SDUPTHER

## 2021-08-22 RX ORDER — NEOSTIGMINE METHYLSULFATE 5 MG/5 ML
SYRINGE (ML) INTRAVENOUS PRN
Status: DISCONTINUED | OUTPATIENT
Start: 2021-08-22 | End: 2021-08-22 | Stop reason: SDUPTHER

## 2021-08-22 RX ORDER — ONDANSETRON 2 MG/ML
INJECTION INTRAMUSCULAR; INTRAVENOUS PRN
Status: DISCONTINUED | OUTPATIENT
Start: 2021-08-22 | End: 2021-08-22 | Stop reason: SDUPTHER

## 2021-08-22 RX ORDER — GLYCOPYRROLATE 1 MG/5 ML
SYRINGE (ML) INTRAVENOUS PRN
Status: DISCONTINUED | OUTPATIENT
Start: 2021-08-22 | End: 2021-08-22 | Stop reason: SDUPTHER

## 2021-08-22 RX ORDER — DEXAMETHASONE SODIUM PHOSPHATE 10 MG/ML
INJECTION INTRAMUSCULAR; INTRAVENOUS PRN
Status: DISCONTINUED | OUTPATIENT
Start: 2021-08-22 | End: 2021-08-22 | Stop reason: SDUPTHER

## 2021-08-22 RX ADMIN — ALBUTEROL SULFATE 4 PUFF: 90 AEROSOL, METERED RESPIRATORY (INHALATION) at 12:19

## 2021-08-22 RX ADMIN — PIPERACILLIN AND TAZOBACTAM 3375 MG: 3; .375 INJECTION, POWDER, LYOPHILIZED, FOR SOLUTION INTRAVENOUS at 07:30

## 2021-08-22 RX ADMIN — Medication 0.4 MG: at 12:54

## 2021-08-22 RX ADMIN — PROPOFOL 150 MG: 10 INJECTION, EMULSION INTRAVENOUS at 10:35

## 2021-08-22 RX ADMIN — SODIUM CHLORIDE: 9 INJECTION, SOLUTION INTRAVENOUS at 12:35

## 2021-08-22 RX ADMIN — FENTANYL CITRATE 50 MCG: 50 INJECTION, SOLUTION INTRAMUSCULAR; INTRAVENOUS at 10:35

## 2021-08-22 RX ADMIN — ONDANSETRON 4 MG: 2 INJECTION, SOLUTION INTRAMUSCULAR; INTRAVENOUS at 12:54

## 2021-08-22 RX ADMIN — PIPERACILLIN AND TAZOBACTAM 3375 MG: 3; .375 INJECTION, POWDER, FOR SOLUTION INTRAVENOUS at 15:18

## 2021-08-22 RX ADMIN — FENTANYL CITRATE 50 MCG: 50 INJECTION, SOLUTION INTRAMUSCULAR; INTRAVENOUS at 11:11

## 2021-08-22 RX ADMIN — Medication 3 MG: at 12:54

## 2021-08-22 RX ADMIN — ROCURONIUM BROMIDE 40 MG: 10 INJECTION INTRAVENOUS at 10:35

## 2021-08-22 RX ADMIN — FENTANYL CITRATE 50 MCG: 50 INJECTION, SOLUTION INTRAMUSCULAR; INTRAVENOUS at 12:23

## 2021-08-22 RX ADMIN — METOPROLOL TARTRATE 3 MG: 1 INJECTION, SOLUTION INTRAVENOUS at 12:37

## 2021-08-22 RX ADMIN — SODIUM CHLORIDE, PRESERVATIVE FREE 10 ML: 5 INJECTION INTRAVENOUS at 21:00

## 2021-08-22 RX ADMIN — METOPROLOL TARTRATE 2 MG: 1 INJECTION, SOLUTION INTRAVENOUS at 13:04

## 2021-08-22 RX ADMIN — PIPERACILLIN AND TAZOBACTAM 3375 MG: 3; .375 INJECTION, POWDER, FOR SOLUTION INTRAVENOUS at 22:34

## 2021-08-22 RX ADMIN — ALBUTEROL SULFATE 4 PUFF: 90 AEROSOL, METERED RESPIRATORY (INHALATION) at 10:55

## 2021-08-22 RX ADMIN — SODIUM CHLORIDE: 9 INJECTION, SOLUTION INTRAVENOUS at 10:29

## 2021-08-22 RX ADMIN — ACETAMINOPHEN 1000 MG: 500 TABLET ORAL at 22:34

## 2021-08-22 RX ADMIN — DEXAMETHASONE SODIUM PHOSPHATE 10 MG: 10 INJECTION INTRAMUSCULAR; INTRAVENOUS at 10:48

## 2021-08-22 RX ADMIN — LIDOCAINE HYDROCHLORIDE 50 MG: 10 INJECTION, SOLUTION EPIDURAL; INFILTRATION; INTRACAUDAL; PERINEURAL at 10:35

## 2021-08-22 RX ADMIN — FENTANYL CITRATE 50 MCG: 50 INJECTION, SOLUTION INTRAMUSCULAR; INTRAVENOUS at 10:44

## 2021-08-22 ASSESSMENT — PULMONARY FUNCTION TESTS
PIF_VALUE: 0
PIF_VALUE: 1
PIF_VALUE: 24
PIF_VALUE: 23
PIF_VALUE: 27
PIF_VALUE: 24
PIF_VALUE: 21
PIF_VALUE: 23
PIF_VALUE: 15
PIF_VALUE: 23
PIF_VALUE: 27
PIF_VALUE: 24
PIF_VALUE: 23
PIF_VALUE: 22
PIF_VALUE: 23
PIF_VALUE: 24
PIF_VALUE: 21
PIF_VALUE: 24
PIF_VALUE: 23
PIF_VALUE: 27
PIF_VALUE: 27
PIF_VALUE: 22
PIF_VALUE: 24
PIF_VALUE: 24
PIF_VALUE: 20
PIF_VALUE: 26
PIF_VALUE: 25
PIF_VALUE: 24
PIF_VALUE: 28
PIF_VALUE: 23
PIF_VALUE: 5
PIF_VALUE: 20
PIF_VALUE: 24
PIF_VALUE: 24
PIF_VALUE: 23
PIF_VALUE: 28
PIF_VALUE: 23
PIF_VALUE: 24
PIF_VALUE: 6
PIF_VALUE: 14
PIF_VALUE: 23
PIF_VALUE: 23
PIF_VALUE: 27
PIF_VALUE: 24
PIF_VALUE: 26
PIF_VALUE: 23
PIF_VALUE: 22
PIF_VALUE: 28
PIF_VALUE: 29
PIF_VALUE: 22
PIF_VALUE: 28
PIF_VALUE: 3
PIF_VALUE: 7
PIF_VALUE: 14
PIF_VALUE: 24
PIF_VALUE: 23
PIF_VALUE: 6
PIF_VALUE: 13
PIF_VALUE: 23
PIF_VALUE: 24
PIF_VALUE: 24
PIF_VALUE: 0
PIF_VALUE: 13
PIF_VALUE: 25
PIF_VALUE: 16
PIF_VALUE: 23
PIF_VALUE: 25
PIF_VALUE: 5
PIF_VALUE: 26
PIF_VALUE: 22
PIF_VALUE: 23
PIF_VALUE: 25
PIF_VALUE: 24
PIF_VALUE: 25
PIF_VALUE: 24
PIF_VALUE: 29
PIF_VALUE: 23
PIF_VALUE: 20
PIF_VALUE: 24
PIF_VALUE: 25
PIF_VALUE: 1
PIF_VALUE: 26
PIF_VALUE: 24
PIF_VALUE: 25
PIF_VALUE: 23
PIF_VALUE: 24
PIF_VALUE: 25
PIF_VALUE: 25
PIF_VALUE: 7
PIF_VALUE: 24
PIF_VALUE: 23
PIF_VALUE: 32
PIF_VALUE: 24
PIF_VALUE: 5
PIF_VALUE: 24
PIF_VALUE: 23
PIF_VALUE: 15
PIF_VALUE: 22
PIF_VALUE: 25
PIF_VALUE: 24
PIF_VALUE: 28
PIF_VALUE: 25
PIF_VALUE: 27
PIF_VALUE: 24
PIF_VALUE: 24
PIF_VALUE: 19
PIF_VALUE: 25
PIF_VALUE: 23
PIF_VALUE: 24
PIF_VALUE: 23
PIF_VALUE: 27
PIF_VALUE: 24
PIF_VALUE: 24
PIF_VALUE: 2
PIF_VALUE: 27
PIF_VALUE: 25
PIF_VALUE: 39
PIF_VALUE: 26
PIF_VALUE: 24
PIF_VALUE: 23
PIF_VALUE: 26
PIF_VALUE: 24
PIF_VALUE: 25
PIF_VALUE: 23
PIF_VALUE: 25
PIF_VALUE: 26
PIF_VALUE: 2
PIF_VALUE: 23
PIF_VALUE: 23
PIF_VALUE: 25
PIF_VALUE: 24
PIF_VALUE: 27
PIF_VALUE: 27
PIF_VALUE: 26
PIF_VALUE: 24
PIF_VALUE: 23
PIF_VALUE: 1
PIF_VALUE: 24
PIF_VALUE: 15
PIF_VALUE: 25
PIF_VALUE: 22
PIF_VALUE: 23
PIF_VALUE: 25
PIF_VALUE: 1
PIF_VALUE: 27
PIF_VALUE: 24
PIF_VALUE: 21
PIF_VALUE: 15
PIF_VALUE: 27
PIF_VALUE: 23
PIF_VALUE: 29
PIF_VALUE: 0
PIF_VALUE: 22
PIF_VALUE: 27
PIF_VALUE: 27
PIF_VALUE: 20
PIF_VALUE: 6

## 2021-08-22 ASSESSMENT — PAIN SCALES - GENERAL
PAINLEVEL_OUTOF10: 0
PAINLEVEL_OUTOF10: 0
PAINLEVEL_OUTOF10: 2
PAINLEVEL_OUTOF10: 0

## 2021-08-22 ASSESSMENT — LIFESTYLE VARIABLES: SMOKING_STATUS: 1

## 2021-08-22 NOTE — PLAN OF CARE
Problem: Preoperative Routine:  Goal: Risk for injury before, during, or after surgery or procedure will decrease  Description: Risk for injury before, during, or after surgery or procedure will decrease  Outcome: Ongoing     Problem: Anxiety:  Goal: Ability to modify response to factors that promote anxiety will improve  Description: Ability to modify response to factors that promote anxiety will improve  Outcome: Ongoing  Goal: Level of anxiety will decrease  Description: Level of anxiety will decrease  Outcome: Ongoing     Problem: Gas Exchange - Impaired:  Goal: Levels of oxygenation will improve  Description: Levels of oxygenation will improve  Outcome: Ongoing     Problem: Pain - Acute:  Goal: Recognizes and communicates pain  Description: Recognizes and communicates pain  Outcome: Ongoing     Problem: Tobacco Use:  Goal: Will participate in inpatient tobacco-use cessation counseling  Description: Will participate in inpatient tobacco-use cessation counseling  Outcome: Ongoing

## 2021-08-22 NOTE — ED NOTES
[facility] The patient is NPO, scheduled to go to OR at approximately 1030. She is resting in bed comfortably, rr even and unlabored. She denies any needs at this time.      Joselin José RN  08/22/21 7432

## 2021-08-22 NOTE — PROGRESS NOTES
Patient took one lap around unit without use of assistive devices. She denied SOB and pain. Writer encouraged ambulation.

## 2021-08-22 NOTE — ANESTHESIA PRE PROCEDURE
Department of Anesthesiology  Preprocedure Note       Name:  Jerri Diaz   Age:  79 y.o.  :  1950                                          MRN:  [id number]         Date:  2021      Surgeon: Joanne Larkin):  Ayaka Bojorquez MD    Procedure: Procedure(s):  CHOLECYSTECTOMY LAPAROSCOPIC  request 10:30 am start    Department of Anesthesiology  Pre-Anesthesia Evaluation/Consultation         Name:  Jerri Listen                                         Age:  79 y.o.   MRN:  [id number]             Medications  Current Facility-Administered Medications   Medication Dose Route Frequency Provider Last Rate Last Admin    magnesium hydroxide (MILK OF MAGNESIA) 400 MG/5ML suspension 30 mL  30 mL Oral Daily PRN Courtney Friedman MD        bisacodyl (DULCOLAX) suppository 10 mg  10 mg Rectal Daily PRN Courtney Friedman MD        polyethylene glycol (GLYCOLAX) packet 17 g  17 g Oral Daily Courtney Friedman MD        sodium chloride flush 0.9 % injection 5-40 mL  5-40 mL Intravenous 2 times per day Larwance Martin, DO        sodium chloride flush 0.9 % injection 5-40 mL  5-40 mL Intravenous PRN Larwance Martin, DO        0.9 % sodium chloride infusion  25 mL Intravenous PRN Larwance Martin, DO        ondansetron (ZOFRAN-ODT) disintegrating tablet 4 mg  4 mg Oral Q8H PRN Larwance Martin, DO        Or    [facility]ron [facility] injection 4 mg  4 mg Intravenous Q6H PRN Larwance Martin, DO        acetaminophen (TYLENOL) tablet 1,000 mg  1,000 mg Oral 3 times per day Larwance Martin, DO   1,000 mg at 21 1523    ibuprofen (ADVIL;MOTRIN) tablet 400 mg  400 mg Oral Q6H PRN Larwance Martin, DO        lactated ringers infusion   Intravenous Continuous Larwance Martin,  mL/hr at 21 1523 New Bag at 21 1523     Current Outpatient Medications   Medication Sig Dispense Refill    hydrochlorothiazide (HYDRODIURIL) 12.5 MG tablet Take 12.5 mg by mouth daily      potassium chloride (MICRO-K) 10 MEQ extended release capsule Take 10 mEq by mouth daily      calcium 600 MG TABS tablet Take 1 tablet by mouth daily         No Known Allergies  Patient Active Problem List   Diagnosis    Macular hole of both eyes    Acute cholecystitis     Past Medical History:   Diagnosis Date    Blurred vision, right eye     Breast cancer ([address] 2013    left  radiation    Hypertension     pcp dr Rachel Frazier hole, right eye     Wears dentures     full    Wears glasses     reading     Past Surgical History:   Procedure Laterality Date    BREAST BIOPSY      right breast    BREAST LUMPECTOMY Left 2013    CATARACT REMOVAL WITH IMPLANT Bilateral 2020    COLONOSCOPY  2009    COLONOSCOPY N/A 4/15/2021    COLONOSCOPY POLYPECTOMY SNARE/COLD BIOPSY performed by Rene Eller MD at [facility]    removed from chest   [facility]    UPPER GASTROINTESTINAL ENDOSCOPY N/A 4/15/2021    EGD BIOPSY performed by Rene Eller MD at [address] [place] [address] VITRECTOMY Right 2020    VITRECTOMY 25 GAUGE, AIR FLUID EXCHANGE, MEMBRANE PEEL, ICG (Right )    VITRECTOMY Right 2020    VITRECTOMY 25 GAUGE, AIR FLUID EXCHANGE, MEMBRANE PEEL, ICG, AIR GAS EXCHANGE 16%SF6 performed by Rimma Pierre MD at [facility] Use    Smoking status: Current Every Day Smoker     Packs/day: 1.00     Years: 40.00     Pack years: 40.00    Smokeless tobacco: Never Used   Vaping Use    Vaping Use: Never used   Substance Use Topics    Alcohol use: No    Drug use: No         Vital Signs (Current)   Vitals:    21 1601   BP: (!) 134/58   Pulse:    Resp:    Temp:    SpO2: 95%     Vital Signs Statistics (for past 48 hrs)     Temp  Av.2 °F (37.3 °C)  Min: 99.2 °F (37.3 °C)   Min taken time: 21 0325  Max: 99.2 °F (37.3 °C)   Max taken time: 21 0325  Pulse  Av.5  Min: 80   Min taken time: 21 0718  Max: 99   Max taken time: 21 0325  Resp  Av  Min: 12   Min taken time: 21 032  Max: 16   Max taken time: 21 032  BP  Min: 112/61   Min taken time: 21 1252  Max: 161/89   Max taken time: 21 0600  MAP (mmHg)  Av.9  Min: 68   Min taken time: 21 1302  Max: 110   Max taken time: 21 0600  SpO2  Av.3 %  Min: 95 %   Min taken time: 21 1601  Max: 100 %   Max taken time: 21 0811  BP Readings from Last 3 Encounters:   21 (!) 134/58   21 113/75   04/15/21 132/69       BMI  Body mass index is 27.46 kg/m². CBC   Lab Results   Component Value Date    WBC 12.6 2021    RBC 4.06 2021    RBC 4.62 2012    HGB 12.1 2021    HCT 36.1 2021    MCV 88.9 2021    RDW 13.5 2021     2021     2012       CMP    Lab Results   Component Value Date     2021    K 4.0 2021     2021    CO2 24 2021    BUN 8 2021    CREATININE 0.65 2021    GFRAA >60 2021    LABGLOM >60 2021    GLUCOSE 101 2021    GLUCOSE 115 2012    PROT 8.7 2021    CALCIUM 9.6 2021    BILITOT 0.44 2021    ALKPHOS 121 2021    AST 14 2021    ALT 14 2021       BMP    Lab Results   Component Value Date     2021    K 4.0 2021     2021    CO2 24 2021    BUN 8 2021    CREATININE 0.65 2021    CALCIUM 9.6 2021    GFRAA >60 2021    LABGLOM >60 2021    GLUCOSE 101 2021    GLUCOSE 115 2012       POC Testing  No results for input(s): POCGLU, POCNA, POCK, POCCL, POCBUN, POCHEMO, POCHCT in the last 72 hours.     Coags  No results found for: PROTIME, INR, APTT    HCG (If Applicable) No results found for: PREGTESTUR, PREGSERUM, HCG, HCGQUANT     ABGs No results found for: PHART, PO2ART, LSJ8CYI, PVX7ISB, BEART, C1ZRVZER     Type & Screen (If Applicable)  No results found for: [facility]    Radiology (If Applicable)    Cardiac Testing (If Applicable)     EKG (If Applicable) PAC's, NS T wave changes          Medications prior to admission:   Prior to Admission medications    Medication Sig Start Date End Date Taking?  Authorizing Provider   hydrochlorothiazide (HYDRODIURIL) 12.5 MG tablet Take 12.5 mg by mouth daily    Historical Provider, MD   potassium chloride (MICRO-K) 10 MEQ extended release capsule Take 10 mEq by mouth daily    Historical Provider, MD   calcium 600 MG TABS tablet Take 1 tablet by mouth daily    Historical Provider, MD       Current medications:    Current Facility-Administered Medications   Medication Dose Route Frequency Provider Last Rate Last Admin    magnesium hydroxide (MILK OF MAGNESIA) 400 MG/5ML suspension 30 mL  30 mL Oral Daily PRN Melva Narayan MD        bisacodyl (DULCOLAX) suppository 10 mg  10 mg Rectal Daily PRN Melva Narayan MD        polyethylene glycol (GLYCOLAX) packet 17 g  17 g Oral Daily Melva Narayan MD        sodium chloride flush 0.9 % injection 5-40 mL  5-40 mL Intravenous 2 times per day Flor Shavon, DO        sodium chloride flush 0.9 % injection 5-40 mL  5-40 mL Intravenous PRN Flor Shavon, DO        0.9 % sodium chloride infusion  25 mL Intravenous PRN Flor Shavon, DO        ondansetron (ZOFRAN-ODT) disintegrating tablet 4 mg  4 mg Oral Q8H PRN Flor Shavon, DO        Or    [facility] injection 4 mg  4 mg Intravenous Q6H PRN Flor Shavon, DO        acetaminophen (TYLENOL) tablet 1,000 mg  1,000 mg Oral 3 times per day Flor Shavon, DO   1,000 mg at 08/21/21 1523    ibuprofen (ADVIL;MOTRIN) tablet 400 mg  400 mg Oral Q6H PRN Flor Shavon, DO        lactated ringers infusion   Intravenous Continuous Flor Shavon,  mL/hr at 08/21/21 1523 New Bag at 08/21/21 1523     Current Outpatient Medications   Medication Sig Dispense Refill    hydrochlorothiazide (HYDRODIURIL) 12.5 MG tablet Take 12.5 mg by mouth daily      potassium chloride (MICRO-K) 10 MEQ extended release capsule Take 10 mEq by mouth daily      calcium 600 MG TABS tablet Take 1 tablet by mouth daily         Allergies:  No Known Allergies    Problem List:    Patient Active Problem List   Diagnosis Code    Macular hole of both eyes H35.343    Acute cholecystitis K81.0       Past Medical History:        Diagnosis Date    Blurred vision, right eye     Breast cancer ([address] 2013    left  radiation    Hypertension     pcp dr [place] hole, right eye     Wears dentures     full    Wears glasses     reading       Past Surgical History:        Procedure Laterality Date    BREAST BIOPSY      right breast    BREAST LUMPECTOMY Left 2013    CATARACT REMOVAL WITH IMPLANT Bilateral 07/2020    COLONOSCOPY  02/2009    COLONOSCOPY N/A 4/15/2021    COLONOSCOPY POLYPECTOMY SNARE/COLD BIOPSY performed by Mervin Nicole MD at [facility]    removed from chest   [address] [place] [address]    UPPER GASTROINTESTINAL ENDOSCOPY N/A 4/15/2021    EGD BIOPSY performed by Mervin Nicole MD at [address] [place] [address] VITRECTOMY Right 12/17/2020    VITRECTOMY 25 GAUGE, AIR FLUID EXCHANGE, MEMBRANE PEEL, ICG (Right )    VITRECTOMY Right 12/17/2020    VITRECTOMY 25 GAUGE, AIR FLUID EXCHANGE, MEMBRANE PEEL, ICG, AIR GAS EXCHANGE 16%SF6 performed by Alberta Khan MD at [address] [place] [address] History:    Social History     Tobacco Use    Smoking status: Current Every Day Smoker     Packs/day: 1.00     Years: 40.00     Pack years: 40.00    Smokeless tobacco: Never Used   Substance Use Topics    Alcohol use:  No                                Ready to quit: Not Answered  Counseling given: Not Answered      Vital Signs (Current):   Vitals:    08/21/21 1400 08/21/21 1431 08/21/21 1532 08/21/21 1601   BP: 114/69 117/89 (!) 124/93 (!) 134/58   Pulse:       Resp: Temp:       SpO2: 95% 99% 99% 95%   Weight:       Height:                                                  BP Readings from Last 3 Encounters:   08/21/21 (!) 134/58   06/21/21 113/75   04/15/21 132/69       NPO Status:  MN                                                                               BMI:   Wt Readings from Last 3 Encounters:   08/21/21 160 lb (72.6 kg)   06/21/21 159 lb 3.2 oz (72.2 kg)   04/15/21 158 lb (71.7 kg)     Body mass index is 27.46 kg/m². CBC:   Lab Results   Component Value Date    WBC 12.6 08/22/2021    RBC 4.06 08/22/2021    RBC 4.62 03/22/2012    HGB 12.1 08/22/2021    HCT 36.1 08/22/2021    MCV 88.9 08/22/2021    RDW 13.5 08/22/2021     08/22/2021     03/22/2012       CMP:   Lab Results   Component Value Date     08/21/2021    K 4.0 08/21/2021     08/21/2021    CO2 24 08/21/2021    BUN 8 08/21/2021    CREATININE 0.65 08/21/2021    GFRAA >60 08/21/2021    LABGLOM >60 08/21/2021    GLUCOSE 101 08/21/2021    GLUCOSE 115 03/22/2012    PROT 8.7 08/21/2021    CALCIUM 9.6 08/21/2021    BILITOT 0.44 08/21/2021    ALKPHOS 121 08/21/2021    AST 14 08/21/2021    ALT 14 08/21/2021       POC Tests: No results for input(s): POCGLU, POCNA, POCK, POCCL, POCBUN, POCHEMO, POCHCT in the last 72 hours.     Coags: No results found for: PROTIME, INR, APTT    HCG (If Applicable): No results found for: PREGTESTUR, PREGSERUM, HCG, HCGQUANT     ABGs: No results found for: PHART, PO2ART, DBT0SLE, AQS8WFD, BEART, S5FPJELV     Type & Screen (If Applicable):  No results found for: LABABO, LABRH    Drug/Infectious Status (If Applicable):  No results found for: HIV, HEPCAB    COVID-19 Screening (If Applicable):   Lab Results   Component Value Date    COVID19 Not Detected 08/21/2021    COVID19 Indeterminate 04/11/2021           Anesthesia Evaluation   no history of anesthetic complications:   Airway: Mallampati: II     Neck ROM: full   Dental:    (+) upper dentures and lower dentures Pulmonary:   (+) current smoker    (-) recent URI                          ROS comment: 40 pk yrs   Cardiovascular:  Exercise tolerance: good (>4 METS),   (+) hypertension:,                   Neuro/Psych:      (-) seizures and CVA           GI/Hepatic/Renal:        (-) GERD      ROS comment: cholecystitis. Endo/Other:    (+) malignancy/cancer. (-) diabetes mellitus                ROS comment: breast CA Abdominal:             Vascular: Other Findings:           Anesthesia Plan      general     ASA 3       Induction: intravenous.                         Bill Schumacher MD   8/22/2021

## 2021-08-22 NOTE — BRIEF OP NOTE
Brief Postoperative Note      Patient: Nathan Sol  YOB: 1950  MRN: [id number]    Date of Procedure: 8/22/2021    Pre-Op Diagnosis: cholelithiasis    Post-Op Diagnosis: Acute cholecystitis       Procedure(s):  LAPAROSCOPIC CHOLECYSTECTOMY    Surgeon(s):  Leonila Severino MD    Assistant:  Bessy Polanco, PGY3  Michelle Colbert, PGY4    Anesthesia: General    Estimated Blood Loss (mL): 20cc    Fluids: 900c crystalloid    Complications: None    Specimens:   ID Type Source Tests Collected by Time Destination   A : gallbladder an contents Tissue Gallbladder SURGICAL PATHOLOGY Leonila Severino MD 8/22/2021 1252        Implants:  * No implants in log *      Drains: * No LDAs found *    Findings: Acute cholecystitis, impacted gallstone, hydropic gallbladder    Electronically signed by Michelle Colbert MD on 8/22/2021 at 1:01 PM

## 2021-08-22 NOTE — PROGRESS NOTES
Dr Rand Fabry here at bedside-notified of lung sounds and general assessment -no orders /notified of returning to room

## 2021-08-22 NOTE — ANESTHESIA POSTPROCEDURE EVALUATION
Department of Anesthesiology  Postprocedure Note    Patient: Maria M Foote  MRN: [id number]  YOB: 1950  Date of evaluation: 8/22/2021  Time:  1:40 PM     Procedure Summary     Date: 08/22/21 Room / Location: [address]    Anesthesia Start: 4355 Anesthesia Stop: 6996    Procedure: LAPAROSCOPIC CHOLECYSTECTOMY (N/A ) Diagnosis: (cholelithiasis)    Surgeons: Lilibeth Dhillon MD Responsible Provider: Delma Monahan MD    Anesthesia Type: general ASA Status: 3          Anesthesia Type: general    Glen Phase I: Glen Score: 8    Glen Phase II:      Last vitals: Reviewed and per EMR flowsheets.        Anesthesia Post Evaluation    Patient location during evaluation: PACU  Patient participation: complete - patient participated  Level of consciousness: awake and alert  Pain score: 2  Nausea & Vomiting: no nausea  Cardiovascular status: hemodynamically stable  Respiratory status: nasal cannula

## 2021-08-22 NOTE — PROGRESS NOTES
PROGRESS NOTE      PATIENT NAME: Mayela Gómez RECORD NO. [id number]  DATE: 2021  PRIMARY CARE PHYSICIAN: Micki Quispe MD    HD: # 1    ASSESSMENT    Patient Active Problem List   Diagnosis    Macular hole of both eyes    Acute cholecystitis     3 77-year-old female with cholelithiasis, possible cholecystitis. MEDICAL DECISION MAKING AND PLAN    1. NPO for procedure  2. IVF  3. ABx, Zosyn  4. Plan on lap arlen today      NIESHA Conti was seen and examined at bedside, no acute events overnight. Pain has improved. No nausea or emesis. Nervous for procedure. OBJECTIVE  VITALS: Temp: Temp: 98 °F (36.7 °C)Temp  Av °F (36.7 °C)  Min: 98 °F (36.7 °C)  Max: 98 °F (25.0 °C) BP Systolic (97ECS), DUU:003 , Min:112 , QHP:460   Diastolic (45LFL), LFW:01, Min:55, Max:93   Pulse Pulse  Av  Min: 79  Max: 79 Resp Resp  Av  Min: 18  Max: 18 Pulse ox SpO2  Av.9 %  Min: 95 %  Max: 99 %  CONSTITUTIONAL: Alert and oriented times 3, no acute distress and cooperative to examination. HEENT: NCAT  NECK: Soft, trachea midline and straight  LUNGS: No acute respiratory distress or accessory muscle use   CARDIOVASCULAR: Regular rate   ABDOMEN: Soft, non-distended, mildly tender to palpation RUQ. No rebound, guarding, or rigidity   NEUROLOGIC: There are no focalizing motor or sensory deficits  EXTREMITIES: no cyanosis, clubbing or edema    I/O last 3 completed shifts: In: 100 [IV Piggyback:100]  Out: -     Drain/tube output:  No intake/output data recorded.     LAB:  CBC:   Recent Labs     21  03321  0605   WBC 16.6* 12.6*   HGB 15.6* 12.1   HCT 44.3 36.1*   MCV 83.6 88.9    197     BMP:   Recent Labs     21  0330 21  0605    133*   K 4.0 3.9    103   CO2 24 21   BUN 8 7*   CREATININE 0.65 0.54   GLUCOSE 101* 92     COAGS:   Recent Labs     21  03321  0605   PROT 8.7* 6.5       RADIOLOGY:  XR CHEST (2 VW)    Result Date: 8/21/2021  Normal examination. XR ABDOMEN (KUB) (SINGLE AP VIEW)    Result Date: 8/21/2021  No acute or concerning abnormality evident. CT ABDOMEN PELVIS W IV CONTRAST Additional Contrast? None    Result Date: 8/21/2021  Cholelithiasis with suggestion of cholecystitis. Fibroid uterus. US GALLBLADDER RUQ    Result Date: 8/21/2021  Gallstones one of which is lodged in the gallbladder neck with mild thickening of the gallbladder wall and trace pericholecystic fluid. No sonographic Acuna's sign was elicited during the exam.  The constellation of findings is however concerning for acute cholecystitis. If further imaging is deemed warranted HIDA scan is suggested. Fatty liver.          Ricco Garzon DO  8/22/2021, 8:47 AM

## 2021-08-22 NOTE — OP NOTE
Operative Note      Patient: Brayden Urena  YOB: 1950  MRN: [id number]    Date of Procedure: 8/22/2021    Pre-Op Diagnosis: cholelithiasis    Post-Op Diagnosis: Same       Procedure(s):  LAPAROSCOPIC CHOLECYSTECTOMY    Surgeon(s):  Kelly Lynn MD    Assistant:   Hao Villa, PGY3  Kev Pike, PGY4    Anesthesia: General    Estimated Blood Loss (mL): 20  Fluids: 900c crystalloid     Complications: None    Specimens:   ID Type Source Tests Collected by Time Destination   A : gallbladder an contents Tissue Gallbladder SURGICAL PATHOLOGY Kelly Lynn MD 8/22/2021 1252        Implants:  * No implants in log *      Drains: * No LDAs found *    Findings: Acute cholecystitis, impacted gallstone, hydropic gallbladder    Detailed Description of Procedure: Indication: 3year-old female presented to the hospital with 5 days of abdominal pain in the right upper quadrant. Right upper quadrant ultrasound showed gallstone lodged in the gallbladder neck as well as mild thickening of the gallbladder wall and trace pericholecystic fluid. Patient had a leukocytosis. The time working diagnosis was acute cholecystitis versus symptomatic cholelithiasis. Vision was made after the patient laparoscopic cholecystectomy. Risk, benefits, and alternatives were discussed with the patient, she wished to proceed. Informed consent was obtained. Procedure:   Patient was taken to the operative suite and transferred over to the operating table in the supine position. Anesthesia team initiated general anesthesia. Patient was then prepped and draped in the typical standard fashion. Patient had been receiving antibiotics on the floor thus no further antibiotics were indicated in the OR. Timeout was called to ensure proper patient, position, and procedure being performed; all in attendance were in agreement. Procedure was started by anesthetizing just inferior to the umbilicus with 1.68% Marcaine. Semilunar incision was made just inferior to the umbilicus, this was carried down to the fascia using Bovie electrocautery, the fascia was grasped with 2 Harper's and an 11 blade scalpel was used to go through the fascia, the peritoneum was grasped with pickups and opened with Metzenbaum scissors. A 2-0 Vicryl on a UR 6 needle was used to create a U stitch through our fascia. 12 mm sound port was inserted into the abdomen secured with the Vicryl and the gas was started. The abdomen insufflated. Scope was entered through the port and the area directly lower port was inspected for injury, of which none was found. The scope was then focused on the right upper quadrant and the gallbladder was seen along with several adhesions to the abdominal wall. At this timean incision was made laterally and slightly superior to our original incision after injecting the area with 0.25% Marcaine. A 5 mm port was inserted into the abdomen under direct visualization. Laparoscopic scissors were placed through this port to take down adhesions from the abdominal wall. Once the adhesions were removed incision was made in the midline superior to our original incision after anesthetizing the area with 0.25% Marcaine. 11 mm port was then placed through this incision into the abdomen under direct visualization. Then under direct visualization a fourth port was placed, a 5 mm port, slightly more lateral and superior to our first 5 mm port. We then attempted to grasp the gallbladder however we were found that the gallbladder was incredibly difficult to grasp so we used an aspiration needle to aspirate the gallbladder. 40cc of hydroptic contents were aspirated from the gallbladder. The gallbladder was then grasped and pushed superiorly towards the head. Small tears to the liver capsule were noticed just lateral and medial to the gallbladder due to some adhesions to the omentum. Bovie electrocautery was used to control the bleeding. A combination of sharp dissection with Bovie electrocautery and the [place] as well as blunt dissection with the suction  was used to create a window between the gallbladder and the cystic plate of the liver. Dissection was carried up and down the peritoneum on either side of the gallbladder using Bovie electrocautery. Continued our blunt and sharp dissection were able to identify the cystic duct and what appeared to be cystic artery and a posterior cystic artery. This point we are comfortable we had fat and peritoneum cleared from the hepatocystic triangle and the lower one third of the gallbladder off the cystic plate. We could visualize the cystic duct and 2 arteries entering the gallbladder. Decided our critical view of safety had been obtained. We then clipped the cystic duct and used laparoscopic scissors to take the duct. The anteriormost artery was then clipped also taken with laparoscopic scissors. Then the posterior artery was clipped and taken with laparoscopic scissors. The gallbladder was then removed from the cystic plate using Bovie electrocautery. Gallbladder then placed in Endo Catch bag. The gallbladder was entered twice for removing it from the cystic plate billing minimal hydroptic content. The area around the stick plate was then thoroughly irrigated with normal saline using the suction . The laparoscopic argon beam was then used to control any bleeding from the small capsular liver tears and the cystic plate. Once hemostasis was ensured once again irrigated around the cystic plate and over top the liver using the suction . Now satisfied with our hemostasis we inspected the rest of the abdomen suctioned any fluid we encountered. The ports were then removed under direct visualization. The gallbladder in the Endo Catch bag was removed through the 12 mm Qiu incision.   As much of the pneumoperitoneum was expressed through the incisions as possible. The previously placed 2-0 Vicryl U stitch was then tied down to close the fascia at the 12 mm Qiu port. All skin incisions were then closed with 4-0 Monocryl and then skin glue. This concluded the procedure. All sponge, needle, and instrument counts were correct at the end of the case. Patient was extubated and taken safely to PACU in stable condition.      Dr. Tony Cheek was present and scrubbed for the duration of the procedure    Electronically signed by Ernst Grove DO on 8/22/2021 at 7:56 PM

## 2021-08-22 NOTE — H&P
Tubal ligation (1990); Tonsillectomy and adenoidectomy; skin biopsy (2000); Breast biopsy; Colonoscopy (02/2009); Breast lumpectomy (Left, 2013); Cataract removal with implant (Bilateral, 07/2020); vitrectomy (Right, 12/17/2020); vitrectomy (Right, 12/17/2020); Colonoscopy (N/A, 4/15/2021); and Upper gastrointestinal endoscopy (N/A, 4/15/2021). Medications  Home Medications           Prior to Admission medications    Medication Sig Start Date End Date Taking? Authorizing Provider   hydrochlorothiazide (HYDRODIURIL) 12.5 MG tablet Take 12.5 mg by mouth daily       Historical Provider, MD   potassium chloride (MICRO-K) 10 MEQ extended release capsule Take 10 mEq by mouth daily       Historical Provider, MD   calcium 600 MG TABS tablet Take 1 tablet by mouth daily       Historical Provider, MD       Scheduled Meds:  Scheduled Medications    polyethylene glycol  17 g Oral Daily         Continuous Infusions:  Infusions Meds        PRN Meds:. PRN Medications   magnesium hydroxide, bisacodyl     Allergies  has No Known Allergies. Family History  family history includes Breast Cancer in her mother; Cancer in her sister; Diabetes in her mother; Hypertension in her mother and sister. Social History   reports that she has been smoking. She has a 40.00 pack-year smoking history. She has never used smokeless tobacco.   reports no history of alcohol use. reports no history of drug use.   Review of Systems  General Denies any fever or chills  HEENT Denies any diplopia, tinnitus or vertigo  Resp Denies any shortness of breath, cough or wheezing  Cardiac Denies any chest pain, palpitations, claudication or edema  GI Positive for abdominal pain, nausea    Denies any frequency, urgency, hesitancy or incontinence  Heme Denies bruising or bleeding easily  Endocrine Denies any history of diabetes or thyroid disease  Neuro Denies any focal motor or sensory deficits     PHYSICAL:   VITALS:  height is 5' 4\" (1.626 m) and weight is 160 lb (72.6 kg). Her temperature is 99.2 °F (37.3 °C). Her blood pressure is 127/81 and her pulse is 99. Her respiration is 16 and oxygen saturation is 98%. CONSTITUTIONAL: Alert and oriented times 3, no acute distress and cooperative to examination. HEENT: NCAT  NECK: Soft, trachea midline and straight  LUNGS: No acute respiratory distress or accessory muscle use   CARDIOVASCULAR: Regular rate   ABDOMEN: Soft, non-distended, mildly tender to palpation RUQ.  No rebound, guarding, or rigidity   NEUROLOGIC: There are no focalizing motor or sensory deficits  EXTREMITIES: no cyanosis, clubbing or edema     LABS:           Recent Labs     08/21/21  0330 08/21/21  0358   WBC 16.6*  --    HGB 15.6*  --    HCT 44.3  --      --      --    K 4.0  --      --    CO2 24  --    BUN 8  --    CREATININE 0.65  --    CALCIUM 9.6  --    AST 14  --    ALT 14  --    BILITOT 0.44  --    NITRU  --  NEGATIVE   COLORU  --  YELLOW   BACTERIA  --  MANY*          Recent Labs     08/21/21  0330   ALKPHOS 121*   ALT 14   AST 14   BILITOT 0.44   LABALBU 4.0   LIPASE 27      CBC with Differential:          Lab Results   Component Value Date     WBC 16.6 08/21/2021     RBC 5.30 08/21/2021     RBC 4.62 03/22/2012     HGB 15.6 08/21/2021     HCT 44.3 08/21/2021      08/21/2021      03/22/2012     MCV 83.6 08/21/2021     MCH 29.4 08/21/2021     MCHC 35.2 08/21/2021     RDW 13.2 08/21/2021     LYMPHOPCT 15 08/21/2021     MONOPCT 8 08/21/2021     BASOPCT 0 08/21/2021     MONOSABS 1.28 08/21/2021     LYMPHSABS 2.47 08/21/2021     EOSABS 0.15 08/21/2021     BASOSABS 0.07 08/21/2021     DIFFTYPE NOT REPORTED 08/21/2021      CMP:          Lab Results   Component Value Date      08/21/2021     K 4.0 08/21/2021      08/21/2021     CO2 24 08/21/2021     BUN 8 08/21/2021     CREATININE 0.65 08/21/2021     GFRAA >60 08/21/2021     LABGLOM >60 08/21/2021     GLUCOSE 101 08/21/2021     GLUCOSE 115 03/22/2012     PROT 8.7 08/21/2021     LABALBU 4.0 08/21/2021     CALCIUM 9.6 08/21/2021     BILITOT 0.44 08/21/2021     ALKPHOS 121 08/21/2021     AST 14 08/21/2021     ALT 14 08/21/2021      Troponin:          Lab Results   Component Value Date     TROPONINI 0.00 12/15/2016      LIPASE:          Lab Results   Component Value Date     LIPASE 27 08/21/2021      Lactic Acid: 1.3        RADIOLOGY:   XR CHEST (2 VW)     Result Date: 8/21/2021  EXAMINATION: TWO XRAY VIEWS OF THE CHEST 8/21/2021 3:54 am COMPARISON: 12/15/2016 HISTORY: ORDERING SYSTEM PROVIDED HISTORY: atypical chest pain TECHNOLOGIST PROVIDED HISTORY: atypical chest pain FINDINGS: Heart size and pulmonary vasculature are normal.  The lungs are clear and normally expanded. Surrounding osseous and soft tissue structures are unremarkable.      Normal examination.      XR ABDOMEN (KUB) (SINGLE AP VIEW)     Result Date: 8/21/2021  EXAMINATION: ONE SUPINE XRAY VIEW(S) OF THE ABDOMEN 8/21/2021 3:54 am COMPARISON: None. HISTORY: ORDERING SYSTEM PROVIDED HISTORY: constipation TECHNOLOGIST PROVIDED HISTORY: constipation FINDINGS: The bowel gas pattern is within normal limits. No free air or large mass is identified. The visualized lung bases are clear. Surrounding osseous and soft tissue structures are unremarkable.      No acute or concerning abnormality evident.      CT ABDOMEN PELVIS W IV CONTRAST Additional Contrast? None     Result Date: 8/21/2021  EXAMINATION: CT OF THE ABDOMEN AND PELVIS WITH CONTRAST 8/21/2021 4:25 am TECHNIQUE: CT of the abdomen and pelvis was performed with the administration of intravenous contrast. Multiplanar reformatted images are provided for review. Dose modulation, iterative reconstruction, and/or weight based adjustment of the mA/kV was utilized to reduce the radiation dose to as low as reasonably achievable. COMPARISON: None.  HISTORY: ORDERING SYSTEM PROVIDED HISTORY: abd pain TECHNOLOGIST PROVIDED HISTORY: abd pain Decision Support Exception - unselect if not a suspected or confirmed emergency medical condition->Emergency Medical Condition (MA) Reason for Exam: abd pain FINDINGS: Lower Chest:  Visualized portion of the lower chest demonstrates no acute abnormality. Organs: The liver is unremarkable. The gallbladder contain several mildly calcified centrally lucent gallstones. The largest and most calcified is lodged at the neck of the gallbladder and measures 14 x 15 x 24 mm. There is mild gallbladder wall thickening. The pancreas, spleen, adrenal glands, bilateral ureters are normal.  Tiny bilateral renal hypodensities are present consistent with cysts but too small to accurately characterize. No follow-up recommended. GI/Bowel: Stomach and duodenal sweep demonstrate no acute abnormality. There is no evidence of bowel obstruction. No evidence of abnormal bowel wall thickening or distension. The appendix is visualized and is unremarkable. No evidence of acute appendicitis. Pelvis: The bladder is nearly empty and not well evaluated. No gross abnormality is evident. The uterus is enlarged, lobulated and partially calcified consistent with multiple degenerative fibroids. Peritoneum/Retroperitoneum: No evidence of ascites or free air. No evidence of lymphadenopathy. Aorta is normal in caliber. Bones/Soft Tissues: Unremarkable.      Cholelithiasis with suggestion of cholecystitis. Fibroid uterus.      Thank you for the interesting evaluation. Further recommendations to follow.

## 2021-08-22 NOTE — ED NOTES
Report given to [facility]  No change in patient status  Continues to rest quietly       [place]  08/22/21 [id number]

## 2021-08-22 NOTE — ED NOTES
Patient ambulated back to bed after going to bathroom  Patient placed on cardiac monitor, BP cuff and pulse ox. Alarms set.        Jacky Horn RN  08/21/21 [id number]

## 2021-08-23 VITALS
HEIGHT: 64 IN | TEMPERATURE: 98.2 F | SYSTOLIC BLOOD PRESSURE: 117 MMHG | RESPIRATION RATE: 16 BRPM | OXYGEN SATURATION: 100 % | WEIGHT: 160 LBS | BODY MASS INDEX: 27.31 KG/M2 | HEART RATE: 83 BPM | DIASTOLIC BLOOD PRESSURE: 56 MMHG

## 2021-08-23 LAB
ABSOLUTE EOS #: <0.03 K/UL (ref 0–0.44)
ABSOLUTE IMMATURE GRANULOCYTE: 0.08 K/UL (ref 0–0.3)
ABSOLUTE LYMPH #: 1.7 K/UL (ref 1.1–3.7)
ABSOLUTE MONO #: 1.3 K/UL (ref 0.1–1.2)
ANION GAP SERPL CALCULATED.3IONS-SCNC: 12 MMOL/L (ref 9–17)
BASOPHILS # BLD: 0 % (ref 0–2)
BASOPHILS ABSOLUTE: 0.04 K/UL (ref 0–0.2)
BUN BLDV-MCNC: 9 MG/DL (ref 8–23)
BUN/CREAT BLD: ABNORMAL (ref 9–20)
CALCIUM SERPL-MCNC: 8.6 MG/DL (ref 8.6–10.4)
CHLORIDE BLD-SCNC: 107 MMOL/L (ref 98–107)
CO2: 21 MMOL/L (ref 20–31)
CREAT SERPL-MCNC: 0.64 MG/DL (ref 0.5–0.9)
DIFFERENTIAL TYPE: ABNORMAL
EOSINOPHILS RELATIVE PERCENT: 0 % (ref 1–4)
GFR AFRICAN AMERICAN: >60 ML/MIN
GFR NON-AFRICAN AMERICAN: >60 ML/MIN
GFR SERPL CREATININE-BSD FRML MDRD: ABNORMAL ML/MIN/{1.73_M2}
GFR SERPL CREATININE-BSD FRML MDRD: ABNORMAL ML/MIN/{1.73_M2}
GLUCOSE BLD-MCNC: 155 MG/DL (ref 70–99)
HCT VFR BLD CALC: 34.5 % (ref 36.3–47.1)
HEMOGLOBIN: 11.8 G/DL (ref 11.9–15.1)
IMMATURE GRANULOCYTES: 0 %
LYMPHOCYTES # BLD: 9 % (ref 24–43)
MCH RBC QN AUTO: 30.1 PG (ref 25.2–33.5)
MCHC RBC AUTO-ENTMCNC: 34.2 G/DL (ref 28.4–34.8)
MCV RBC AUTO: 88 FL (ref 82.6–102.9)
MONOCYTES # BLD: 7 % (ref 3–12)
NRBC AUTOMATED: 0 PER 100 WBC
PDW BLD-RTO: 13.5 % (ref 11.8–14.4)
PLATELET # BLD: 206 K/UL (ref 138–453)
PLATELET ESTIMATE: ABNORMAL
PMV BLD AUTO: 11.4 FL (ref 8.1–13.5)
POTASSIUM SERPL-SCNC: 3.7 MMOL/L (ref 3.7–5.3)
RBC # BLD: 3.92 M/UL (ref 3.95–5.11)
RBC # BLD: ABNORMAL 10*6/UL
SEG NEUTROPHILS: 84 % (ref 36–65)
SEGMENTED NEUTROPHILS ABSOLUTE COUNT: 15.12 K/UL (ref 1.5–8.1)
SODIUM BLD-SCNC: 140 MMOL/L (ref 135–144)
WBC # BLD: 18.3 K/UL (ref 3.5–11.3)
WBC # BLD: ABNORMAL 10*3/UL

## 2021-08-23 PROCEDURE — G0378 HOSPITAL OBSERVATION PER HR: HCPCS

## 2021-08-23 PROCEDURE — 36415 COLL VENOUS BLD VENIPUNCTURE: CPT

## 2021-08-23 PROCEDURE — 6370000000 HC RX 637 (ALT 250 FOR IP): Performed by: STUDENT IN AN ORGANIZED HEALTH CARE EDUCATION/TRAINING PROGRAM

## 2021-08-23 PROCEDURE — 88304 TISSUE EXAM BY PATHOLOGIST: CPT

## 2021-08-23 PROCEDURE — 6360000002 HC RX W HCPCS

## 2021-08-23 PROCEDURE — 96372 THER/PROPH/DIAG INJ SC/IM: CPT

## 2021-08-23 PROCEDURE — 80048 BASIC METABOLIC PNL TOTAL CA: CPT

## 2021-08-23 PROCEDURE — 85025 COMPLETE CBC W/AUTO DIFF WBC: CPT

## 2021-08-23 RX ORDER — POLYETHYLENE GLYCOL 3350 17 G/17G
17 POWDER, FOR SOLUTION ORAL DAILY
Qty: 10 EACH | Refills: 0 | Status: SHIPPED | OUTPATIENT
Start: 2021-08-23 | End: 2021-09-02

## 2021-08-23 RX ORDER — IBUPROFEN 400 MG/1
400 TABLET ORAL EVERY 6 HOURS PRN
Qty: 20 TABLET | Refills: 0 | Status: SHIPPED | OUTPATIENT
Start: 2021-08-23

## 2021-08-23 RX ADMIN — ENOXAPARIN SODIUM 40 MG: 40 INJECTION SUBCUTANEOUS at 11:54

## 2021-08-23 RX ADMIN — ACETAMINOPHEN 1000 MG: 500 TABLET ORAL at 06:37

## 2021-08-23 RX ADMIN — POLYETHYLENE GLYCOL 3350 17 G: 17 POWDER, FOR SOLUTION ORAL at 08:30

## 2021-08-23 ASSESSMENT — PAIN SCALES - GENERAL: PAINLEVEL_OUTOF10: 2

## 2021-08-23 NOTE — CARE COORDINATION
Discharge Report    Met with patient at bedside. She states she has good support at home and has a ride. She is denying any needs at this time.      [facility])  Clinical Case Management Department  Written by: Katiana Montoya RN    Patient Name: [facility]  Attending Provider: Lance Martin DO  Admit Date: 2021  3:18 AM  MRN: [id number]  Account: [de-identified]                     : 1950  Discharge Date: 21      Disposition: home    Katiana Montoya RN

## 2021-08-23 NOTE — PROGRESS NOTES
POST OP NOTE    SUBJECTIVE  Pt s/p laparoscopic cholecystectomy. .     OBJECTIVE  VITALS:  /67   Pulse 73   Temp 97.6 °F (36.4 °C) (Axillary)   Resp 18   Ht 5' 4\" (1.626 m)   Wt 160 lb (72.6 kg)   SpO2 97%   BMI 27.46 kg/m²         GENERAL:  awake and alert. no apparent distress, No acute distress  CARDIOVASCULAR:  regular rate and rhythm   LUNGS:  clear to auscultation  ABDOMEN:   Abdomen soft, non-tender, non-distended  INCISION: Incision clean/dry/intact    ASSESSMENT  1. POD# 0 s/p laparoscopic cholecystectomy    PLAN  1. Pain management-MM PT  2.   Regular diet        Carmelita Tiwari MD  Trauma/Surgery Service  8/22/2021 at 9:11 PM

## 2021-08-23 NOTE — PROGRESS NOTES
Patient discharged with family member and all belongings to private residence. Discharge paperwork provided and discussed. All questions answered. One IV removed with no complications and catheter intact. Two medications provided to patient via meds to beds. Patient ambulated off unit.

## 2021-08-23 NOTE — PROGRESS NOTES
PROGRESS NOTE      PATIENT NAME: Sailaja Jean-Baptiste RECORD NO. [id number]  DATE: 2021  PRIMARY CARE PHYSICIAN: Amador Matthew MD    HD: # 2    ASSESSMENT    Patient Active Problem List   Diagnosis    Macular hole of both eyes    Acute cholecystitis     3 70-year-old female with cholelithiasis, possible cholecystitis. MEDICAL DECISION MAKING AND PLAN    1. S/p laparoscopic choleycystectomy  1. Pain controlled  2. Incision sites healing well  3. Passing gas and ambulating  2. CLD- advanced to regular this afternoon  3. UO: 0.5   4. F/u am labs  5. DVT: lovenox  6. If tolerating diet, will d/c this afternoon      Liseth Matos was seen and examined at bedside, no acute events overnight. Denies pain/nausea/vomiting. She is passing gas and urinating on her own, tolerating CLD,       OBJECTIVE  VITALS: Temp: Temp: 98 °F (36.7 °C)Temp  Av.6 °F (36.4 °C)  Min: 97.3 °F (36.3 °C)  Max: 98.7 °F (31.1 °C) BP Systolic (20TFR), RGF:535 , Min:88 , IWW:230   Diastolic (30TQI), HXB:68, Min:54, Max:98   Pulse Pulse  Av.1  Min: 70  Max: 83 Resp Resp  Av.2  Min: 0  Max: 20 Pulse ox SpO2  Av.3 %  Min: 96 %  Max: 100 %  CONSTITUTIONAL: Alert and oriented times 3, no acute distress and cooperative to examination. HEENT: NCAT  NECK: Soft, trachea midline and straight  LUNGS: No acute respiratory distress or accessory muscle use   CARDIOVASCULAR: Regular rate   ABDOMEN: Soft, non-distended, mildly tender to palpation RUQ. No rebound, guarding, or rigidity   NEUROLOGIC: There are no focalizing motor or sensory deficits  EXTREMITIES: no cyanosis, clubbing or edema    I/O last 3 completed shifts: In: 8776 [P.O.:120; I.V.:2475]  Out: 910 [Urine:880; Blood:30]    Drain/tube output: In: 1687 [P.O.:120;  I.V.:1425]  Out: 880 [Urine:880]    LAB:  CBC:   Recent Labs     21  0330 21  0605   WBC 16.6* 12.6*   HGB 15.6* 12.1   HCT 44.3 36.1*   MCV 83.6 88.9    197     BMP:

## 2021-08-23 NOTE — PROGRESS NOTES
CLINICAL PHARMACY NOTE: MEDS TO BEDS    Total # of Prescriptions Filled: 2   The following medications were delivered to the patient:  · Ibuprofen 400mg  · Clearlax    Additional Documentation: delivered to patient in room 434 8/23 at 11:53am. Co-pay paid with cash ($6.00). To get better and follow your care plan as instructed.

## 2021-08-23 NOTE — PROGRESS NOTES
Patient tolerated regular diet lunch. No complaints of nausea or pain. Plan to continue with discharge to home.

## 2021-08-24 LAB — SURGICAL PATHOLOGY REPORT: NORMAL

## 2021-08-27 LAB
CULTURE: NORMAL
CULTURE: NORMAL
Lab: NORMAL
Lab: NORMAL
SPECIMEN DESCRIPTION: NORMAL
SPECIMEN DESCRIPTION: NORMAL

## 2021-09-07 ENCOUNTER — OFFICE VISIT (OUTPATIENT)
Dept: SURGERY | Age: 71
End: 2021-09-07
Payer: MEDICARE

## 2021-09-07 VITALS
SYSTOLIC BLOOD PRESSURE: 123 MMHG | HEIGHT: 64 IN | WEIGHT: 154 LBS | BODY MASS INDEX: 26.29 KG/M2 | DIASTOLIC BLOOD PRESSURE: 82 MMHG | HEART RATE: 111 BPM

## 2021-09-07 DIAGNOSIS — K81.0 ACUTE CHOLECYSTITIS: Primary | ICD-10-CM

## 2021-09-07 PROCEDURE — 99024 POSTOP FOLLOW-UP VISIT: CPT | Performed by: SPECIALIST

## 2021-09-07 ASSESSMENT — ENCOUNTER SYMPTOMS
CONSTIPATION: 0
NAUSEA: 0
ABDOMINAL PAIN: 0
VOMITING: 0
DIARRHEA: 0

## 2021-09-07 NOTE — PROGRESS NOTES
[facility] New Patient Visit      CHIEF COMPLAINT:    Chief Complaint   Patient presents with    New Patient    Wound Check       HISTORY OF PRESENT ILLNESS:      The patient is a 70 y.o. female who is being seen for consultation and evaluation of post op visit for cholecystectomy performed 8/22. Patient very pleased with outcome. No n/v/d/c. Eating and drinking well.     Past Medical History:    Past Medical History:   Diagnosis Date    Blurred vision, right eye     Breast cancer ([address] 2013    left  radiation    Hypertension     pcp dr Holland Katz hole, right eye     Wears dentures     full    Wears glasses     reading       Past SurgicalHistory:    Past Surgical History:   Procedure Laterality Date    BREAST BIOPSY      right breast    BREAST LUMPECTOMY Left 2013    CATARACT REMOVAL WITH IMPLANT Bilateral 07/2020    CHOLECYSTECTOMY  08/22/2021    laprascopic    CHOLECYSTECTOMY, LAPAROSCOPIC N/A 8/22/2021    LAPAROSCOPIC CHOLECYSTECTOMY performed by Raquel Cooper MD at [place] [address]  02/2009    COLONOSCOPY N/A 4/15/2021    COLONOSCOPY POLYPECTOMY SNARE/COLD BIOPSY performed by Jim Hart MD at [facility]    removed from chest   [address] [place] [address] ENDOSCOPY N/A 4/15/2021    EGD BIOPSY performed by Jim Hart MD at [address] [place] [address] VITRECTOMY Right 12/17/2020    VITRECTOMY 25 GAUGE, AIR FLUID EXCHANGE, MEMBRANE PEEL, ICG (Right )    VITRECTOMY Right 12/17/2020    VITRECTOMY 25 GAUGE, AIR FLUID EXCHANGE, MEMBRANE PEEL, ICG, AIR GAS EXCHANGE 16%SF6 performed by Estiven Worthy MD at [place] [address]       Current Medications:   Current Outpatient Medications   Medication Sig Dispense Refill    ibuprofen (ADVIL;MOTRIN) 400 MG tablet Take 1 tablet by mouth every 6 hours as needed for Pain 20 tablet 0    hydrochlorothiazide (HYDRODIURIL) 12.5 MG tablet Take 12.5 mg by mouth daily      potassium chloride (MICRO-K) 10 MEQ extended release capsule Take 10 mEq by mouth daily      calcium 600 MG TABS tablet Take 1 tablet by mouth daily       No current facility-administered medications for this visit. Allergies:    Patient has no known allergies. Social History:   Social History     Socioeconomic History    Marital status:      Spouse name: Not on file    Number of children: Not on file    Years of education: Not on file    Highest education level: Not on file   Occupational History    Not on file   Tobacco Use    Smoking status: Current Every Day Smoker     Packs/day: 1.00     Years: 40.00     Pack years: 40.00    Smokeless tobacco: Never Used    Tobacco comment: trying to quit   Vaping Use    Vaping Use: Never used   Substance and Sexual Activity    Alcohol use: No    Drug use: No    Sexual activity: Not on file   Other Topics Concern    Not on file   Social History Narrative    Not on file     Social Determinants of Health     Financial Resource Strain:     Difficulty of Paying Living Expenses:    Food Insecurity:     Worried About Running Out of Food in the Last Year:     [address] in the Last Year:    Transportation Needs:     Lack of Transportation (Medical):      Lack of Transportation (Non-Medical):    Physical Activity:     Days of Exercise per Week:     Minutes of Exercise per Session:    Stress:     Feeling of Stress :    Social Connections:     Frequency of Communication with Friends and Family:     Frequency of Social Gatherings with Friends and Family:     Attends [employer] Services:     Active Member of Clubs or Organizations:     Attends Club or Organization Meetings:     Marital Status:    Intimate Partner Violence:     Fear of Current or Ex-Partner:     Emotionally Abused:     Physically Abused:     Sexually Abused:        Family History:  Family History   Problem Relation Age of Onset    Hypertension Mother     Diabetes Mother     Breast Cancer Mother     Hypertension Sister     Cancer Sister         liver       Review of Systems   Constitutional: Negative for chills and fever. Gastrointestinal: Negative for abdominal pain, constipation, diarrhea, nausea and vomiting. PHYSICAL EXAM:  /82   Pulse 111   Ht 5' 4\" (1.626 m)   Wt 154 lb (69.9 kg)   BMI 26.43 kg/m²   CONSTITUTIONAL: awake, alert, cooperative, no apparent distress  Physical Exam  Vitals and nursing note reviewed. Constitutional:       General: She is not in acute distress. Appearance: She is well-developed. HENT:      Head: Normocephalic and atraumatic. Cardiovascular:      Rate and Rhythm: Normal rate. Pulmonary:      Effort: Pulmonary effort is normal. No respiratory distress. Abdominal:      Palpations: Abdomen is soft. Tenderness: There is no abdominal tenderness. There is no guarding or rebound. Musculoskeletal:         General: Normal range of motion. Cervical back: Normal range of motion and neck supple. Skin:     General: Skin is warm and dry. Capillary Refill: Capillary refill takes less than 2 seconds. Comments: Surgical sites have healed well   Neurological:      General: No focal deficit present. Mental Status: She is alert and oriented to person, place, and time. Psychiatric:         Mood and Affect: Mood normal.         Behavior: Behavior normal.         Thought Content: Thought content normal.         Judgment: Judgment normal.         Radiology:       ASSESSMENT:     1.  Acute cholecystitis         PLAN:  -You can start to resume normal activities next week; no heavy lifting for 6 wks  -F/u only as needed      Vinay Paez, [address], [place], [place]   1:51 PM 9/7/2021     Trauma, Emergency and Critical Surgical Services  Attending Progress Note   I have discussed the care of this patient including pertinent history and exam findings,  with the CNP. I have seen and examined the patient and the key elements of all parts of the encounter have been performed by me. I agree with the assessment, plan and orders as documented by the CNP.     Electronically signed by Aggie Malone MD on 9/7/2021 at 1:56 PM

## 2022-06-13 ENCOUNTER — HOSPITAL ENCOUNTER (OUTPATIENT)
Dept: MAMMOGRAPHY | Age: 72
Discharge: HOME | End: 2022-06-15
Payer: MEDICARE

## 2022-06-13 DIAGNOSIS — Z12.31 SCREENING MAMMOGRAM FOR BREAST CANCER: ICD-10-CM

## 2022-06-13 PROCEDURE — 77063 BREAST TOMOSYNTHESIS BI: CPT

## 2022-08-26 ENCOUNTER — HOSPITAL ENCOUNTER (OUTPATIENT)
Age: 72
Discharge: HOME | End: 2022-08-26
Payer: MEDICARE

## 2022-08-26 LAB
ABSOLUTE EOS #: 0.18 K/UL (ref 0–0.44)
ABSOLUTE IMMATURE GRANULOCYTE: 0.05 K/UL (ref 0–0.3)
ABSOLUTE LYMPH #: 2.55 K/UL (ref 1.1–3.7)
ABSOLUTE MONO #: 1.02 K/UL (ref 0.1–1.2)
ALBUMIN SERPL-MCNC: 4.2 G/DL (ref 3.5–5.2)
ALBUMIN/GLOBULIN RATIO: 1.1 (ref 1–2.5)
ALP BLD-CCNC: 98 U/L (ref 35–104)
ALT SERPL-CCNC: 11 U/L (ref 5–33)
ANION GAP SERPL CALCULATED.3IONS-SCNC: 12 MMOL/L (ref 9–17)
AST SERPL-CCNC: 15 U/L
BASOPHILS # BLD: 1 % (ref 0–2)
BASOPHILS ABSOLUTE: 0.07 K/UL (ref 0–0.2)
BILIRUB SERPL-MCNC: 0.34 MG/DL (ref 0.3–1.2)
BUN BLDV-MCNC: 16 MG/DL (ref 8–23)
CALCIUM SERPL-MCNC: 9.2 MG/DL (ref 8.6–10.4)
CHLORIDE BLD-SCNC: 101 MMOL/L (ref 98–107)
CO2: 22 MMOL/L (ref 20–31)
CREAT SERPL-MCNC: 0.7 MG/DL (ref 0.5–0.9)
EOSINOPHILS RELATIVE PERCENT: 2 % (ref 1–4)
GFR AFRICAN AMERICAN: >60 ML/MIN
GFR NON-AFRICAN AMERICAN: >60 ML/MIN
GFR SERPL CREATININE-BSD FRML MDRD: NORMAL ML/MIN/{1.73_M2}
GLUCOSE BLD-MCNC: 96 MG/DL (ref 70–99)
HCT VFR BLD CALC: 40.4 % (ref 36.3–47.1)
HEMOGLOBIN: 14.9 G/DL (ref 11.9–15.1)
IMMATURE GRANULOCYTES: 0 %
LH: 16.4 MIU/ML (ref 7.7–58.5)
LYMPHOCYTES # BLD: 23 % (ref 24–43)
MCH RBC QN AUTO: 31.2 PG (ref 25.2–33.5)
MCHC RBC AUTO-ENTMCNC: 36.9 G/DL (ref 28.4–34.8)
MCV RBC AUTO: 84.5 FL (ref 82.6–102.9)
MONOCYTES # BLD: 9 % (ref 3–12)
NRBC AUTOMATED: 0 PER 100 WBC
PDW BLD-RTO: 13.6 % (ref 11.8–14.4)
PLATELET # BLD: 251 K/UL (ref 138–453)
PMV BLD AUTO: 10.7 FL (ref 8.1–13.5)
POTASSIUM SERPL-SCNC: 4.1 MMOL/L (ref 3.7–5.3)
RBC # BLD: 4.78 M/UL (ref 3.95–5.11)
SEG NEUTROPHILS: 65 % (ref 36–65)
SEGMENTED NEUTROPHILS ABSOLUTE COUNT: 7.29 K/UL (ref 1.5–8.1)
SODIUM BLD-SCNC: 135 MMOL/L (ref 135–144)
TOTAL PROTEIN: 8.2 G/DL (ref 6.4–8.3)
TSH SERPL DL<=0.05 MIU/L-ACNC: 0.05 UIU/ML (ref 0.3–5)
WBC # BLD: 11.2 K/UL (ref 3.5–11.3)

## 2022-08-26 PROCEDURE — 81206 BCR/ABL1 GENE MAJOR BP: CPT

## 2022-08-26 PROCEDURE — 80053 COMPREHEN METABOLIC PANEL: CPT

## 2022-08-26 PROCEDURE — 84443 ASSAY THYROID STIM HORMONE: CPT

## 2022-08-26 PROCEDURE — 83002 ASSAY OF GONADOTROPIN (LH): CPT

## 2022-08-26 PROCEDURE — 36415 COLL VENOUS BLD VENIPUNCTURE: CPT

## 2022-08-26 PROCEDURE — 85025 COMPLETE CBC W/AUTO DIFF WBC: CPT

## 2022-08-31 LAB
BCR-ABL QUANTITATIVE: NOT DETECTED
BCR-ABL1, PERCENT: 0 %
BCR/ABL SOURCE: NORMAL
EER BCR-ABL1, MAJOR: NORMAL

## 2022-11-08 ENCOUNTER — HOSPITAL ENCOUNTER (OUTPATIENT)
Dept: MAMMOGRAPHY | Age: 72
Discharge: HOME | End: 2022-11-10
Payer: MEDICARE

## 2022-11-08 DIAGNOSIS — M81.0 SENILE OSTEOPOROSIS: ICD-10-CM

## 2022-11-08 PROCEDURE — 77080 DXA BONE DENSITY AXIAL: CPT

## 2022-12-16 ENCOUNTER — HOSPITAL ENCOUNTER (OUTPATIENT)
Age: 72
Discharge: HOME | End: 2022-12-16
Payer: MEDICARE

## 2022-12-16 LAB
ABSOLUTE EOS #: 0.15 K/UL (ref 0–0.44)
ABSOLUTE IMMATURE GRANULOCYTE: <0.03 K/UL (ref 0–0.3)
ABSOLUTE LYMPH #: 2.5 K/UL (ref 1.1–3.7)
ABSOLUTE MONO #: 0.71 K/UL (ref 0.1–1.2)
ALBUMIN SERPL-MCNC: 4 G/DL (ref 3.5–5.2)
ALBUMIN/GLOBULIN RATIO: 1 (ref 1–2.5)
ALP BLD-CCNC: 94 U/L (ref 35–104)
ALT SERPL-CCNC: 11 U/L (ref 5–33)
ANION GAP SERPL CALCULATED.3IONS-SCNC: 10 MMOL/L (ref 9–17)
AST SERPL-CCNC: 17 U/L
BASOPHILS # BLD: 1 % (ref 0–2)
BASOPHILS ABSOLUTE: 0.06 K/UL (ref 0–0.2)
BILIRUB SERPL-MCNC: 0.5 MG/DL (ref 0.3–1.2)
BUN BLDV-MCNC: 12 MG/DL (ref 8–23)
CALCIUM SERPL-MCNC: 9.5 MG/DL (ref 8.6–10.4)
CHLORIDE BLD-SCNC: 102 MMOL/L (ref 98–107)
CO2: 26 MMOL/L (ref 20–31)
CREAT SERPL-MCNC: 0.67 MG/DL (ref 0.5–0.9)
EOSINOPHILS RELATIVE PERCENT: 2 % (ref 1–4)
GFR SERPL CREATININE-BSD FRML MDRD: >60 ML/MIN/1.73M2
GLUCOSE BLD-MCNC: 142 MG/DL (ref 70–99)
HCT VFR BLD CALC: 40.1 % (ref 36.3–47.1)
HEMOGLOBIN: 13.9 G/DL (ref 11.9–15.1)
IMMATURE GRANULOCYTES: 0 %
LYMPHOCYTES # BLD: 27 % (ref 24–43)
MCH RBC QN AUTO: 29.3 PG (ref 25.2–33.5)
MCHC RBC AUTO-ENTMCNC: 34.7 G/DL (ref 28.4–34.8)
MCV RBC AUTO: 84.4 FL (ref 82.6–102.9)
MONOCYTES # BLD: 8 % (ref 3–12)
NRBC AUTOMATED: 0 PER 100 WBC
PDW BLD-RTO: 13.9 % (ref 11.8–14.4)
PLATELET # BLD: 228 K/UL (ref 138–453)
PMV BLD AUTO: 11 FL (ref 8.1–13.5)
POTASSIUM SERPL-SCNC: 3.9 MMOL/L (ref 3.7–5.3)
RBC # BLD: 4.75 M/UL (ref 3.95–5.11)
SEG NEUTROPHILS: 62 % (ref 36–65)
SEGMENTED NEUTROPHILS ABSOLUTE COUNT: 5.74 K/UL (ref 1.5–8.1)
SODIUM BLD-SCNC: 138 MMOL/L (ref 135–144)
T3 FREE: 3.33 PG/ML (ref 2.02–4.43)
THYROXINE, FREE: 1.33 NG/DL (ref 0.93–1.7)
TOTAL PROTEIN: 8.2 G/DL (ref 6.4–8.3)
TSH SERPL DL<=0.05 MIU/L-ACNC: 0.03 UIU/ML (ref 0.3–5)
WBC # BLD: 9.2 K/UL (ref 3.5–11.3)

## 2022-12-16 PROCEDURE — 80053 COMPREHEN METABOLIC PANEL: CPT

## 2022-12-16 PROCEDURE — 36415 COLL VENOUS BLD VENIPUNCTURE: CPT

## 2022-12-16 PROCEDURE — 84481 FREE ASSAY (FT-3): CPT

## 2022-12-16 PROCEDURE — 85025 COMPLETE CBC W/AUTO DIFF WBC: CPT

## 2022-12-16 PROCEDURE — 84439 ASSAY OF FREE THYROXINE: CPT

## 2022-12-16 PROCEDURE — 84443 ASSAY THYROID STIM HORMONE: CPT

## 2023-06-29 ENCOUNTER — HOSPITAL ENCOUNTER (OUTPATIENT)
Dept: MAMMOGRAPHY | Age: 73
Discharge: HOME | End: 2023-07-01
Payer: MEDICARE

## 2023-06-29 DIAGNOSIS — Z12.31 VISIT FOR SCREENING MAMMOGRAM: ICD-10-CM

## 2023-06-29 PROCEDURE — 77063 BREAST TOMOSYNTHESIS BI: CPT

## 2024-02-02 VITALS — BODY MASS INDEX: 26.98 KG/M2 | HEIGHT: 64 IN | WEIGHT: 158 LBS

## 2024-02-09 ENCOUNTER — HOSPITAL ENCOUNTER (OUTPATIENT)
Dept: CT IMAGING | Age: 74
End: 2024-02-09
Payer: MEDICARE

## 2024-02-09 DIAGNOSIS — Z87.891 PERSONAL HISTORY OF TOBACCO USE, PRESENTING HAZARDS TO HEALTH: ICD-10-CM

## 2024-02-09 DIAGNOSIS — F17.210 CIGARETTE SMOKER: ICD-10-CM

## 2024-02-09 PROCEDURE — 71271 CT THORAX LUNG CANCER SCR C-: CPT

## 2024-02-21 ENCOUNTER — HOSPITAL ENCOUNTER (OUTPATIENT)
Age: 74
Discharge: HOME | End: 2024-02-21
Payer: MEDICARE

## 2024-02-21 LAB
ALBUMIN SERPL-MCNC: 3.6 G/DL (ref 3.5–5.2)
ALBUMIN/GLOB SERPL: 0.8 {RATIO} (ref 1–2.5)
ALP SERPL-CCNC: 112 U/L (ref 35–104)
ALT SERPL-CCNC: 13 U/L (ref 5–33)
ANION GAP SERPL CALCULATED.3IONS-SCNC: 12 MMOL/L (ref 9–17)
AST SERPL-CCNC: 19 U/L
BASOPHILS # BLD: 0.05 K/UL (ref 0–0.2)
BASOPHILS NFR BLD: 1 % (ref 0–2)
BILIRUB SERPL-MCNC: 0.3 MG/DL (ref 0.3–1.2)
BUN SERPL-MCNC: 10 MG/DL (ref 8–23)
CALCIUM SERPL-MCNC: 9.2 MG/DL (ref 8.6–10.4)
CHLORIDE SERPL-SCNC: 101 MMOL/L (ref 98–107)
CO2 SERPL-SCNC: 21 MMOL/L (ref 20–31)
CREAT SERPL-MCNC: 0.7 MG/DL (ref 0.5–0.9)
EOSINOPHIL # BLD: 0.19 K/UL (ref 0–0.44)
EOSINOPHILS RELATIVE PERCENT: 2 % (ref 1–4)
ERYTHROCYTE [DISTWIDTH] IN BLOOD BY AUTOMATED COUNT: 13.7 % (ref 11.8–14.4)
GFR SERPL CREATININE-BSD FRML MDRD: >60 ML/MIN/1.73M2
GLUCOSE SERPL-MCNC: 118 MG/DL (ref 70–99)
HCT VFR BLD AUTO: 41.7 % (ref 36.3–47.1)
HGB BLD-MCNC: 14.7 G/DL (ref 11.9–15.1)
IMM GRANULOCYTES # BLD AUTO: <0.03 K/UL (ref 0–0.3)
IMM GRANULOCYTES NFR BLD: 0 %
LYMPHOCYTES NFR BLD: 2.73 K/UL (ref 1.1–3.7)
LYMPHOCYTES RELATIVE PERCENT: 25 % (ref 24–43)
MCH RBC QN AUTO: 30 PG (ref 25.2–33.5)
MCHC RBC AUTO-ENTMCNC: 35.3 G/DL (ref 28.4–34.8)
MCV RBC AUTO: 85.1 FL (ref 82.6–102.9)
MONOCYTES NFR BLD: 0.83 K/UL (ref 0.1–1.2)
MONOCYTES NFR BLD: 8 % (ref 3–12)
NEUTROPHILS NFR BLD: 64 % (ref 36–65)
NEUTS SEG NFR BLD: 7 K/UL (ref 1.5–8.1)
NRBC BLD-RTO: 0 PER 100 WBC
PLATELET # BLD AUTO: 232 K/UL (ref 138–453)
PMV BLD AUTO: 10.9 FL (ref 8.1–13.5)
POTASSIUM SERPL-SCNC: 4.1 MMOL/L (ref 3.7–5.3)
PROT SERPL-MCNC: 8.2 G/DL (ref 6.4–8.3)
RBC # BLD AUTO: 4.9 M/UL (ref 3.95–5.11)
SODIUM SERPL-SCNC: 134 MMOL/L (ref 135–144)
WBC OTHER # BLD: 10.8 K/UL (ref 3.5–11.3)

## 2024-02-21 PROCEDURE — 85025 COMPLETE CBC W/AUTO DIFF WBC: CPT

## 2024-02-21 PROCEDURE — 36415 COLL VENOUS BLD VENIPUNCTURE: CPT

## 2024-02-21 PROCEDURE — 80053 COMPREHEN METABOLIC PANEL: CPT

## 2024-06-28 ENCOUNTER — HOSPITAL ENCOUNTER (OUTPATIENT)
Dept: MAMMOGRAPHY | Age: 74
End: 2024-06-28
Payer: MEDICARE

## 2024-06-28 DIAGNOSIS — Z12.31 ENCOUNTER FOR SCREENING MAMMOGRAM FOR MALIGNANT NEOPLASM OF BREAST: ICD-10-CM

## 2024-06-28 PROCEDURE — 77063 BREAST TOMOSYNTHESIS BI: CPT

## 2025-02-06 ENCOUNTER — TRANSCRIBE ORDERS (OUTPATIENT)
Dept: ADMINISTRATIVE | Age: 75
End: 2025-02-06

## 2025-02-06 DIAGNOSIS — F17.210 CIGARETTE SMOKER: ICD-10-CM

## 2025-02-06 DIAGNOSIS — Z87.891 PERSONAL HISTORY OF TOBACCO USE, PRESENTING HAZARDS TO HEALTH: Primary | ICD-10-CM

## 2025-02-18 ENCOUNTER — APPOINTMENT (OUTPATIENT)
Dept: GENERAL RADIOLOGY | Age: 75
End: 2025-02-18
Payer: MEDICARE

## 2025-02-18 ENCOUNTER — HOSPITAL ENCOUNTER (EMERGENCY)
Age: 75
Discharge: HOME | End: 2025-02-18
Attending: EMERGENCY MEDICINE
Payer: MEDICARE

## 2025-02-18 ENCOUNTER — APPOINTMENT (OUTPATIENT)
Dept: CT IMAGING | Age: 75
End: 2025-02-18
Payer: MEDICARE

## 2025-02-18 VITALS
DIASTOLIC BLOOD PRESSURE: 75 MMHG | SYSTOLIC BLOOD PRESSURE: 130 MMHG | TEMPERATURE: 97.9 F | BODY MASS INDEX: 25.61 KG/M2 | OXYGEN SATURATION: 96 % | RESPIRATION RATE: 16 BRPM | HEART RATE: 93 BPM | HEIGHT: 64 IN | WEIGHT: 150 LBS

## 2025-02-18 DIAGNOSIS — S82.61XA CLOSED DISPLACED FRACTURE OF LATERAL MALLEOLUS OF RIGHT FIBULA, INITIAL ENCOUNTER: Primary | ICD-10-CM

## 2025-02-18 PROCEDURE — 29515 APPLICATION SHORT LEG SPLINT: CPT

## 2025-02-18 PROCEDURE — 73700 CT LOWER EXTREMITY W/O DYE: CPT

## 2025-02-18 PROCEDURE — 6370000000 HC RX 637 (ALT 250 FOR IP)

## 2025-02-18 PROCEDURE — 99284 EMERGENCY DEPT VISIT MOD MDM: CPT

## 2025-02-18 PROCEDURE — 73610 X-RAY EXAM OF ANKLE: CPT

## 2025-02-18 PROCEDURE — 76376 3D RENDER W/INTRP POSTPROCES: CPT

## 2025-02-18 RX ORDER — HYDROCODONE BITARTRATE AND ACETAMINOPHEN 5; 325 MG/1; MG/1
1 TABLET ORAL EVERY 6 HOURS PRN
Qty: 12 TABLET | Refills: 0 | Status: SHIPPED | OUTPATIENT
Start: 2025-02-18 | End: 2025-02-21

## 2025-02-18 RX ORDER — ERGOCALCIFEROL 1.25 MG/1
50000 CAPSULE, LIQUID FILLED ORAL WEEKLY
Qty: 12 CAPSULE | Refills: 1 | Status: SHIPPED | OUTPATIENT
Start: 2025-02-18

## 2025-02-18 RX ORDER — ACETAMINOPHEN 500 MG
1000 TABLET ORAL ONCE
Status: COMPLETED | OUTPATIENT
Start: 2025-02-18 | End: 2025-02-18

## 2025-02-18 RX ADMIN — ACETAMINOPHEN 1000 MG: 500 TABLET ORAL at 15:23

## 2025-02-18 ASSESSMENT — ENCOUNTER SYMPTOMS
VOMITING: 0
DIARRHEA: 0
NAUSEA: 0

## 2025-02-18 ASSESSMENT — PAIN SCALES - GENERAL
PAINLEVEL_OUTOF10: 2
PAINLEVEL_OUTOF10: 2

## 2025-02-18 ASSESSMENT — PAIN - FUNCTIONAL ASSESSMENT
PAIN_FUNCTIONAL_ASSESSMENT: ACTIVITIES ARE NOT PREVENTED
PAIN_FUNCTIONAL_ASSESSMENT: 0-10

## 2025-02-18 ASSESSMENT — PAIN DESCRIPTION - LOCATION
LOCATION: FOOT;ANKLE
LOCATION: FOOT;LEG

## 2025-02-18 ASSESSMENT — PAIN DESCRIPTION - ORIENTATION
ORIENTATION: RIGHT
ORIENTATION: RIGHT

## 2025-02-18 ASSESSMENT — LIFESTYLE VARIABLES
HOW OFTEN DO YOU HAVE A DRINK CONTAINING ALCOHOL: NEVER
HOW MANY STANDARD DRINKS CONTAINING ALCOHOL DO YOU HAVE ON A TYPICAL DAY: PATIENT DOES NOT DRINK

## 2025-02-18 ASSESSMENT — PAIN DESCRIPTION - DESCRIPTORS: DESCRIPTORS: ACHING;DISCOMFORT

## 2025-02-18 NOTE — DISCHARGE INSTRUCTIONS
Podiatry Instructions:  Please make and attend follow-up appointment with Dr. Hernán Neal within 1 week of discharge  Please maintain non-weightbearing to the right lower extremity  Please keep splint and dressings in tact until follow-up appointment    You were evaluated in the emergency department for right sided ankle pain.  Your x-ray shows you have a fracture of your fibula.  You were seen by podiatrist who put you in a splint, you are also provided crutches.  Podiatry instructions are given above.  You are also given prescription for pain medications, take 1 tablet every 6 hours as needed for pain. Do not drive while taking this medication as it can make you sleepy. Be aware that the pain medication does have Tylenol in it and you are not taking additional Tylenol. You can also take ibuprofen every 6-8 hours as needed for pain.    Follow-up with your PCP in the next 2 to 3 days.     Return to the emergency department if experience worsening symptoms, worsening severe pain, numbness or tingling, change in color in your right leg/ankle, high fever, difficulty breathing, chest pain, lightheadedness, dizziness, severe headache, or if you have any new issue or concern.

## 2025-02-18 NOTE — ED NOTES
Pt is A+Ox4  Pt complains of right foot and right ankle pain   Pt states she fell on the ice last week  Pt denies hitting head  Pt is able to ambulate on the right ankle with a steady gait  Family at the bedside  No active bleeding or open wounds tot he right foot or the right ankle  All questions answered and needs met at this time  Whiteboard updated

## 2025-02-18 NOTE — ED PROVIDER NOTES
[facility]     Emergency Department     Faculty Attestation    I performed a history and physical examination of the patient and discussed management with the resident. I have reviewed and agree with the resident’s findings including all diagnostic interpretations, and treatment plans as written at the time of my review. Any areas of disagreement are noted on the chart. I was personally present for the key portions of any procedures. I have documented in the chart those procedures where I was not present during the key portions. For Physician Assistant/ Nurse Practitioner cases/documentation I have personally evaluated this patient and have completed at least one if not all key elements of the E/M (history, physical exam, and MDM). Additional findings are as noted.    PtName: Judith Crabtree  MRN: [id number]  Birthdate 1950  Date of evaluation: 2/18/25  Note Started: 3:22 PM EST    Primary Care Physician: Maribell Tsang APRN - NP        History: This is a 74 y.o. female who presents to the Emergency Department with complaint of leg pain.  Patient is complaining of right ankle pain after a slip and fall approximately 1 to 2 weeks ago.  She denies hitting her head there was no loss conscious.  The patient states she has been \"soaking\" her foot as well as taking analgesia without any improvement of her symptoms.  Denies any cough chest pain or shortness of breath.    Physical:   height is 1.626 m (5' 4\") and weight is 68 kg (150 lb). Her temperature is 97.9 °F (36.6 °C). Her blood pressure is 156/84 (abnormal) and her pulse is 111 (abnormal). Her respiration is 19 and oxygen saturation is 98%.  There is obvious swelling or edema to the right ankle and foot.  There is tenderness to palpation over the lateral malleolus.  There is note tenderness to palpation of the proximal fibular head.  There is no tenderness of the calcaneus or the foot.  There is 3+

## 2025-02-18 NOTE — CONSULTS
Consult Note  Foot and Ankle Surgery    CC/Reason for consult:  Right ankle fracture    HPI:      The patient is a 74 y.o. female seen at  [facility] for pain to the right foot and ankle for 1 week. The patient states they were walking and sustaned a slip fall on ice and had immediate pain to their right lower extremity. At the time of injury  they had difficulty ambulating immediately afterwards due to the pain, but the patient reports that over the past week she has been able to ambulate with only minimal pain, rated at 2/10 in severity. The patient denies any head injury or loss of consciousness at time of injury. Patient retained sensation to lower extremity. They deny any hip, knee, and lower back / trunk injury or pain. The patient denies injury to the contralateral extremity. Past medical history includes hypertension, breast cancer. Patient admits to being current cigarette smoker, 51 pack year history. Patient is a community ambulator. The patient is not a diabetic.  Foot and ankle surgery consulted for emergency department evaluation.      PCP is Maribell Tsang APRN - NP    ROS:   Review of Systems   Constitutional:  Positive for activity change. Negative for chills, diaphoresis, fatigue and fever.   Cardiovascular:  Positive for leg swelling.   Gastrointestinal:  Negative for diarrhea, nausea and vomiting.   Musculoskeletal:  Positive for arthralgias, gait problem, joint swelling and myalgias.   Skin:  Negative for wound.       Past Medical History   has a past medical history of Blurred vision, right eye, Breast cancer (HCC), Hypertension, Macular hole, right eye, Wears dentures, and Wears glasses.    Past Surgical History   has a past surgical history that includes Tubal ligation (1990); Tonsillectomy and adenoidectomy; skin biopsy (2000); Breast biopsy; Colonoscopy (02/2009); Breast lumpectomy (Left, 2013); Cataract removal with implant (Bilateral, 07/2020); vitrectomy (Right, 12/17/2020);

## 2025-02-18 NOTE — ED PROVIDER NOTES
[facility] EMERGENCY DEPARTMENT  Emergency Department Encounter  Emergency Medicine Resident     Pt Name:Judith Crabtree  MRN: [id number]  Birthdate 1950  Date of evaluation: 2/18/25  PCP:  Maribell Tsang APRN - NP  Note Started: 2:59 PM EST      CHIEF COMPLAINT       Chief Complaint   Patient presents with    Fall     Last week     Leg Pain     right       HISTORY OF PRESENT ILLNESS  (Location/Symptom, Timing/Onset, Context/Setting, Quality, Duration, Modifying Factors, Severity.)      Judith Crabtree is a 74 y.o. female who presents with right ankle pain and increased swelling after a fall 2 weeks ago. She was going down some steps to take out the trash and slipped on ice and fell. She did not hit her head, no LOC, not taking any blood thinners. Is able to bear weight on right leg, ambulating without difficulty and without use of cane or walker. Has been taking ibuprofen for pain and swelling however swelling is not going down. Denies any other injuries.     PAST MEDICAL / SURGICAL / SOCIAL / FAMILY HISTORY      has a past medical history of Blurred vision, right eye, Breast cancer (HCC), Hypertension, Macular hole, right eye, Wears dentures, and Wears glasses.       has a past surgical history that includes Tubal ligation (1990); Tonsillectomy and adenoidectomy; skin biopsy (2000); Breast biopsy; Colonoscopy (02/2009); Breast lumpectomy (Left, 2013); Cataract removal with implant (Bilateral, 07/2020); vitrectomy (Right, 12/17/2020); vitrectomy (Right, 12/17/2020); Colonoscopy (N/A, 4/15/2021); Upper gastrointestinal endoscopy (N/A, 4/15/2021); Cholecystectomy (08/22/2021); and Cholecystectomy, laparoscopic (N/A, 8/22/2021).      Social History     Socioeconomic History    Marital status:      Spouse name: Not on file    Number of children: Not on file    Years of education: Not on file    Highest education level: Not on file   Occupational History    Not on file   Tobacco Use    Smoking

## 2025-02-18 NOTE — ED NOTES
Writer offered patient an ice pack, pt refused  Pt placed in gown and provided with a warm blanket

## 2025-02-27 LAB — VITAMIN D 25-HYDROXY: 35.5 NG/ML

## 2025-05-29 LAB — VITAMIN D 25-HYDROXY: 35.3 NG/ML

## 2025-06-03 ENCOUNTER — HOSPITAL ENCOUNTER (OUTPATIENT)
Dept: CT IMAGING | Age: 75
Discharge: HOME | End: 2025-06-05
Payer: MEDICARE

## 2025-06-03 DIAGNOSIS — Z87.891 PERSONAL HISTORY OF TOBACCO USE, PRESENTING HAZARDS TO HEALTH: ICD-10-CM

## 2025-06-03 DIAGNOSIS — F17.210 CIGARETTE SMOKER: ICD-10-CM

## 2025-06-03 PROCEDURE — 71271 CT THORAX LUNG CANCER SCR C-: CPT

## 2025-06-30 ENCOUNTER — HOSPITAL ENCOUNTER (OUTPATIENT)
Dept: MAMMOGRAPHY | Age: 75
Discharge: HOME | End: 2025-07-02
Payer: MEDICARE

## 2025-06-30 VITALS — HEIGHT: 64 IN | WEIGHT: 160 LBS | BODY MASS INDEX: 27.31 KG/M2

## 2025-06-30 DIAGNOSIS — Z12.31 SCREENING MAMMOGRAM FOR BREAST CANCER: ICD-10-CM

## 2025-06-30 PROCEDURE — 77063 BREAST TOMOSYNTHESIS BI: CPT

## 2025-08-20 ENCOUNTER — TRANSCRIBE ORDERS (OUTPATIENT)
Dept: ADMINISTRATIVE | Age: 75
End: 2025-08-20

## 2025-08-20 DIAGNOSIS — M81.0 OSTEOPOROSIS, UNSPECIFIED OSTEOPOROSIS TYPE, UNSPECIFIED PATHOLOGICAL FRACTURE PRESENCE: Primary | ICD-10-CM

## (undated) DEVICE — GARMENT,MEDLINE,DVT,INT,CALF,MED, GEN2: Brand: MEDLINE

## (undated) DEVICE — PLUMEPORT SEO LAPAROSCOPIC SMOKE FILTRATION DEVICE: Brand: PLUMEPORT

## (undated) DEVICE — Device

## (undated) DEVICE — SOLUTION ANTIFOG VIS SYS CLEARIFY LAPSCP

## (undated) DEVICE — Device: Brand: DEFENDO VALVE AND CONNECTOR KIT

## (undated) DEVICE — PACK LAP BASIC

## (undated) DEVICE — GLOVE SURG 7 11.7IN BEAD CUF LIGHT BRN SENSICARE LTX FREE

## (undated) DEVICE — STANDARD HYPODERMIC NEEDLE,ALUMINUM HUB: Brand: MONOJECT

## (undated) DEVICE — OCCLUDER EYE POLYETH HYPOALRG ADH TAPE W/O PINHOLE

## (undated) DEVICE — CONMED DISPOSABLE GASTROINTESTINAL CYTOLOGY BRUSH, STRAIGHT HANDLE, 2.5 MM X 160 CM: Brand: CONMED

## (undated) DEVICE — RETINA PK

## (undated) DEVICE — APPLICATOR MEDICATED 26 CC SOLUTION HI LT ORNG CHLORAPREP

## (undated) DEVICE — PLATE ES AD W 9FT CRD 2

## (undated) DEVICE — FORCEPS BX L240CM WRK CHN 2.8MM STD CAP W/ NDL MIC MESH

## (undated) DEVICE — SYRINGE MED 5ML STD CLR PLAS LUERLOCK TIP N CTRL DISP

## (undated) DEVICE — REVOLUTION DSP 25G ILM FORCEPS: Brand: ALCON GRIESHABER REVOLUTION

## (undated) DEVICE — GLOVE ORANGE PI 7 1/2   MSG9075

## (undated) DEVICE — TROCAR: Brand: KII SHIELDED BLADED ACCESS SYSTEM

## (undated) DEVICE — 25GA EZPASS SOFT TIP CANNULA BOX OF 5: Brand: VORTEX SURGICAL INC

## (undated) DEVICE — GAUZE,SPONGE,4"X4",16PLY,STRL,LF,10/TRAY: Brand: MEDLINE

## (undated) DEVICE — SYRINGE MED 50ML LUERLOCK TIP

## (undated) DEVICE — CO2 CANNULA,SUPERSOFT, ADLT,7'O2,7'CO2: Brand: MEDLINE

## (undated) DEVICE — ADAPTER TBNG LUER STUB 15 GA INTMED

## (undated) DEVICE — CYCLOGEL 1% GTTS

## (undated) DEVICE — TUBING, SUCTION, 1/4" X 12', STRAIGHT: Brand: MEDLINE

## (undated) DEVICE — BLOCK BITE 60FR RUBBER ADLT DENTAL

## (undated) DEVICE — ELECTRODE PT RET AD L9FT HI MOIST COND ADH HYDRGEL CORDED

## (undated) DEVICE — GLOVE SURG SZ 8 CRM LTX FREE POLYISOPRENE POLYMER BEAD ANTI

## (undated) DEVICE — 1 EACH 40411 STERILE DISPOSABLE SUPER VIEW® LENS SET & 1 EACH 40100 STERILE MICROSCOPE DRAPE: Brand: SUPER VIEW® PACK

## (undated) DEVICE — GLOVE SURG SZ 65 THK91MIL LTX FREE SYN POLYISOPRENE

## (undated) DEVICE — TUBING, SUCTION, 9/32" X 20', STRAIGHT: Brand: MEDLINE INDUSTRIES, INC.

## (undated) DEVICE — SURGICAL PROCEDURE PACK 25 GA VITRECTOMY

## (undated) DEVICE — ADHESIVE SKIN CLOSURE TOP 36 CC HI VISC DERMBND MINI

## (undated) DEVICE — SCISSOR SURG METZ CRV TIP

## (undated) DEVICE — AGENT VISCOELASTIC 1ML 2% HYDROXYPROPYL METHCELL PRELD GLS

## (undated) DEVICE — GOWN,AURORA,NONREINFORCED,LARGE: Brand: MEDLINE

## (undated) DEVICE — SUTURE VCRL + SZ 0 L27IN ABSRB VLT L26MM UR-6 5/8 CIR VCP603H

## (undated) DEVICE — GOWN,SIRUS,NONRNF,SETINSLV,XL,20/CS: Brand: MEDLINE

## (undated) DEVICE — CUP MED 1OZ CLR POLYPR FEED GRAD W/O LID

## (undated) DEVICE — ELECTRODE LAPAROSCOPIC LHOOK

## (undated) DEVICE — GOWN,AURORA,NONRNF,XL,30/CS: Brand: MEDLINE

## (undated) DEVICE — GOWN,SIRUS,NONRNF,SETINSLV,2XL,18/CS: Brand: MEDLINE

## (undated) DEVICE — TROCARS: Brand: KII® BALLOON BLUNT TIP SYSTEM

## (undated) DEVICE — GLOVE ORANGE PI 7   MSG9070

## (undated) DEVICE — SUTURE SZ 0 27IN 5/8 CIR UR-6  TAPER PT VIOLET ABSRB VICRYL J603H

## (undated) DEVICE — GLOVE ORANGE PI 8   MSG9080

## (undated) DEVICE — DISSECTOR LAP DIA5MM BLNT TIP ENDOPATH

## (undated) DEVICE — TISSUE RETRIEVAL SYSTEM: Brand: INZII RETRIEVAL SYSTEM

## (undated) DEVICE — TROCAR: Brand: KII® SLEEVE

## (undated) DEVICE — SUTURE MCRYL SZ 4-0 L27IN ABSRB UD L24MM PS-1 3/8 CIR PRIM Y935H

## (undated) DEVICE — BASIN EMSIS 700ML GRAPHITE PLAS KID SHP GRAD

## (undated) DEVICE — TOWEL,OR,DSP,ST,NATURAL,DLX,4/PK,20PK/CS: Brand: MEDLINE

## (undated) DEVICE — JELLY,LUBE,STERILE,FLIP TOP,TUBE,2-OZ: Brand: MEDLINE

## (undated) DEVICE — SOLUTION IRRIG 1000ML PENTALYTE PLAS POUR BTL TIS U SOL

## (undated) DEVICE — MEDICINE CUP, GRADUATED, STER: Brand: MEDLINE